# Patient Record
Sex: MALE | Race: WHITE | NOT HISPANIC OR LATINO | URBAN - METROPOLITAN AREA
[De-identification: names, ages, dates, MRNs, and addresses within clinical notes are randomized per-mention and may not be internally consistent; named-entity substitution may affect disease eponyms.]

---

## 2017-01-03 PROBLEM — Z00.00 ENCOUNTER FOR PREVENTIVE HEALTH EXAMINATION: Status: ACTIVE | Noted: 2017-01-03

## 2019-07-03 ENCOUNTER — INPATIENT (INPATIENT)
Facility: HOSPITAL | Age: 69
LOS: 5 days | Discharge: HOME | DRG: 180 | End: 2019-07-09
Attending: INTERNAL MEDICINE | Admitting: INTERNAL MEDICINE
Payer: MEDICARE

## 2019-07-03 VITALS
TEMPERATURE: 98 F | WEIGHT: 160.06 LBS | SYSTOLIC BLOOD PRESSURE: 117 MMHG | HEART RATE: 57 BPM | RESPIRATION RATE: 18 BRPM | OXYGEN SATURATION: 96 % | HEIGHT: 67 IN | DIASTOLIC BLOOD PRESSURE: 59 MMHG

## 2019-07-03 LAB
ALBUMIN SERPL ELPH-MCNC: 3 G/DL — LOW (ref 3.3–5)
ALP SERPL-CCNC: 301 U/L — HIGH (ref 40–120)
ALT FLD-CCNC: 22 U/L — SIGNIFICANT CHANGE UP (ref 10–45)
ANION GAP SERPL CALC-SCNC: 11 MMOL/L — SIGNIFICANT CHANGE UP (ref 5–17)
APTT BLD: 28.1 SEC — SIGNIFICANT CHANGE UP (ref 27.5–36.3)
AST SERPL-CCNC: 41 U/L — HIGH (ref 10–40)
BILIRUB SERPL-MCNC: 1.2 MG/DL — SIGNIFICANT CHANGE UP (ref 0.2–1.2)
BUN SERPL-MCNC: 36 MG/DL — HIGH (ref 7–23)
CALCIUM SERPL-MCNC: 9.2 MG/DL — SIGNIFICANT CHANGE UP (ref 8.4–10.5)
CHLORIDE SERPL-SCNC: 99 MMOL/L — SIGNIFICANT CHANGE UP (ref 96–108)
CO2 SERPL-SCNC: 23 MMOL/L — SIGNIFICANT CHANGE UP (ref 22–31)
CREAT SERPL-MCNC: 1.68 MG/DL — HIGH (ref 0.5–1.3)
GLUCOSE SERPL-MCNC: 315 MG/DL — HIGH (ref 70–99)
HCT VFR BLD CALC: 30.8 % — LOW (ref 39–50)
HGB BLD-MCNC: 10.5 G/DL — LOW (ref 13–17)
INR BLD: 1.31 RATIO — HIGH (ref 0.88–1.16)
MCHC RBC-ENTMCNC: 34.1 GM/DL — SIGNIFICANT CHANGE UP (ref 32–36)
MCHC RBC-ENTMCNC: 34.9 PG — HIGH (ref 27–34)
MCV RBC AUTO: 102 FL — HIGH (ref 80–100)
PLATELET # BLD AUTO: 73 K/UL — LOW (ref 150–400)
POTASSIUM SERPL-MCNC: 4.5 MMOL/L — SIGNIFICANT CHANGE UP (ref 3.5–5.3)
POTASSIUM SERPL-SCNC: 4.5 MMOL/L — SIGNIFICANT CHANGE UP (ref 3.5–5.3)
PROT SERPL-MCNC: 6.8 G/DL — SIGNIFICANT CHANGE UP (ref 6–8.3)
PROTHROM AB SERPL-ACNC: 15 SEC — HIGH (ref 10–12.9)
RBC # BLD: 3.01 M/UL — LOW (ref 4.2–5.8)
RBC # FLD: 12.7 % — SIGNIFICANT CHANGE UP (ref 10.3–14.5)
SODIUM SERPL-SCNC: 133 MMOL/L — LOW (ref 135–145)
TROPONIN T, HIGH SENSITIVITY RESULT: 28 NG/L — SIGNIFICANT CHANGE UP (ref 0–51)
TROPONIN T, HIGH SENSITIVITY RESULT: 29 NG/L — SIGNIFICANT CHANGE UP (ref 0–51)
WBC # BLD: 7.3 K/UL — SIGNIFICANT CHANGE UP (ref 3.8–10.5)
WBC # FLD AUTO: 7.3 K/UL — SIGNIFICANT CHANGE UP (ref 3.8–10.5)

## 2019-07-03 PROCEDURE — 71046 X-RAY EXAM CHEST 2 VIEWS: CPT | Mod: 26

## 2019-07-03 PROCEDURE — 99285 EMERGENCY DEPT VISIT HI MDM: CPT

## 2019-07-03 PROCEDURE — 71250 CT THORAX DX C-: CPT | Mod: 26

## 2019-07-03 PROCEDURE — 93010 ELECTROCARDIOGRAM REPORT: CPT

## 2019-07-03 RX ORDER — LIDOCAINE 4 G/100G
1 CREAM TOPICAL ONCE
Refills: 0 | Status: COMPLETED | OUTPATIENT
Start: 2019-07-03 | End: 2019-07-03

## 2019-07-03 RX ORDER — IPRATROPIUM/ALBUTEROL SULFATE 18-103MCG
3 AEROSOL WITH ADAPTER (GRAM) INHALATION ONCE
Refills: 0 | Status: DISCONTINUED | OUTPATIENT
Start: 2019-07-03 | End: 2019-07-03

## 2019-07-03 RX ORDER — LIDOCAINE 4 G/100G
1 CREAM TOPICAL ONCE
Refills: 0 | Status: DISCONTINUED | OUTPATIENT
Start: 2019-07-03 | End: 2019-07-03

## 2019-07-03 RX ORDER — MORPHINE SULFATE 50 MG/1
2 CAPSULE, EXTENDED RELEASE ORAL ONCE
Refills: 0 | Status: DISCONTINUED | OUTPATIENT
Start: 2019-07-03 | End: 2019-07-03

## 2019-07-03 RX ORDER — IPRATROPIUM/ALBUTEROL SULFATE 18-103MCG
3 AEROSOL WITH ADAPTER (GRAM) INHALATION ONCE
Refills: 0 | Status: COMPLETED | OUTPATIENT
Start: 2019-07-03 | End: 2019-07-03

## 2019-07-03 RX ADMIN — Medication 3 MILLILITER(S): at 16:44

## 2019-07-03 RX ADMIN — LIDOCAINE 1 PATCH: 4 CREAM TOPICAL at 17:56

## 2019-07-03 RX ADMIN — MORPHINE SULFATE 2 MILLIGRAM(S): 50 CAPSULE, EXTENDED RELEASE ORAL at 21:25

## 2019-07-03 RX ADMIN — MORPHINE SULFATE 2 MILLIGRAM(S): 50 CAPSULE, EXTENDED RELEASE ORAL at 17:56

## 2019-07-03 NOTE — ED ADULT NURSE NOTE - OBJECTIVE STATEMENT
68M comes to ED c/o left sided rib pain. Patient states he has been coughing "for a few weeks" and has a new diagnosis of liver CA. He has pain to left lateral side of ribs worse with inspiration. He is a&ox3 and does not appear to be in any acute distress. He denies sternal chest pain/SOB on exertion/N/V/D/dizziness/abdominal pain/fever/chills. States his cough is brown mucus. On exam, tenderness to left lateral ribs, clear lungs, no edema, abdomen soft nontender. Will continue to monitor.

## 2019-07-03 NOTE — ED ADULT NURSE NOTE - CHPI ED NUR SYMPTOMS NEG
no fever/no nausea/no syncope/no diaphoresis/no vomiting/no dizziness/no shortness of breath/no chills/no congestion

## 2019-07-03 NOTE — ED PROVIDER NOTE - ATTENDING CONTRIBUTION TO CARE
Attending MD Alaniz:   I personally have seen and examined this patient.  Physician assistant note reviewed and agree on plan of care and except where noted.  See below for details.     Seen in [place], accompanied by wife and mom  Oncologist Dr. Bruno Khan  PMD Dr. Andrew Malloy    68M with PMH/PSH including HTN, DM, hepatocellular carcinoma with mets not on chemo, cirrhosis with portal hypertension presents to the ED with three days of L sided rib pain for three days.  Reports pain worsened with cough, reports feels like stabbing/knife like pain.  Reports pain with inspiration.  Denies exertional worsening.  Reports productive yellowish brown cough.  Review of patient's results on personal phone via [employer] portal "Nuclear Bone Scan Whole Body 6/20/19" abnormal focal increased uptake in the L hip, R distal femur, anterolateral L 7th rib and posterior R 4th rib highly suspicious for bone metastatic disease.  CT chest without contrast 6/14/19 "multifocal bronchial ectasia with mucous plugging and patchy nodularity exhibits a waxing and waning pattern in comparison to 10/20/17 with overall slight progression.  A chronic infectious/inflammatory process such as nontuberculous mycobacterium is favored.  No suspicious developing thoracic LAD is seen.  Cirrhotic morphology of the liver with ill-defined hypodense regions that may be related to prior therapy or underlying lesions."  Denies abdominal pain, nausea, vomiting, diarrhea, blood in stools. Denies dysuria, hematuria, change in urinary habits including frequency, urgency. Reports takes lactulose every morning.  Uses walker for ambulation.      TO BE COMPLETED Attending MD Alaniz:   I personally have seen and examined this patient.  Physician assistant note reviewed and agree on plan of care and except where noted.  See below for details.     Seen in [place], accompanied by wife and mom  Oncologist Dr. Bruno Khan  PMD Dr. Andrew Malloy    68M with PMH/PSH including HTN, DM, hepatocellular carcinoma with mets not on chemo, cirrhosis with portal hypertension presents to the ED with three days of L sided rib pain for three days.  Reports pain worsened with cough, reports feels like stabbing/knife like pain.  Reports pain with inspiration.  Denies exertional worsening.  Reports productive yellowish brown cough.  Denies shortness of breath.  Review of patient's results on personal phone via [facility] portal "Nuclear Bone Scan Whole Body 6/20/19" abnormal focal increased uptake in the L hip, R distal femur, anterolateral L 7th rib and posterior R 4th rib highly suspicious for bone metastatic disease.  CT chest without contrast 6/14/19 "multifocal bronchial ectasia with mucous plugging and patchy nodularity exhibits a waxing and waning pattern in comparison to 10/20/17 with overall slight progression.  A chronic infectious/inflammatory process such as nontuberculous mycobacterium is favored.  No suspicious developing thoracic LAD is seen.  Cirrhotic morphology of the liver with ill-defined hypodense regions that may be related to prior therapy or underlying lesions."  Denies abdominal pain, nausea, vomiting, diarrhea, blood in stools. Denies dysuria, hematuria, change in urinary habits including frequency, urgency. Reports takes lactulose every morning.  Uses walker for ambulation.  Denies fevers, chills.  On exam, NAD, head NCAT, PERRL, FROM at neck, no tenderness to midline palpation, no stepoffs along length of spine, lungs CTAB with good inspiratory effort, no wheezing, no rhonchi, no rales, +tenderness to palpation at L sided mid to lower ribs, from anterior axillary line to posterior axillary line, +S1S2, no m/r/g, abdomen soft with +BS, NT, ND, no CVAT, moving all extremities with 5/5 strength bilateral upper and lower extremities, good and equal  strength bilaterally, no calf tenderness, swelling, erythema or warmth; A/P: 68M    TO BE COMPLETED Attending MD Alaniz:   I personally have seen and examined this patient.  Physician assistant note reviewed and agree on plan of care and except where noted.  See below for details.     Seen in [place], accompanied by wife and mom  Oncologist Dr. Bruno Khan  PMD Dr. Andrew Malloy    68M with PMH/PSH including HTN, DM, hepatocellular carcinoma with mets not on chemo, cirrhosis with portal hypertension presents to the ED with three days of L sided rib pain for three days.  Reports pain worsened with cough, reports feels like stabbing/knife like pain.  Reports pain with inspiration.  Denies exertional worsening.  Reports productive yellowish brown cough.  Denies shortness of breath.  Review of patient's results on personal phone via [facility] portal "Nuclear Bone Scan Whole Body 6/20/19" abnormal focal increased uptake in the L hip, R distal femur, anterolateral L 7th rib and posterior R 4th rib highly suspicious for bone metastatic disease.  CT chest without contrast 6/14/19 "multifocal bronchial ectasia with mucous plugging and patchy nodularity exhibits a waxing and waning pattern in comparison to 10/20/17 with overall slight progression.  A chronic infectious/inflammatory process such as nontuberculous mycobacterium is favored.  No suspicious developing thoracic LAD is seen.  Cirrhotic morphology of the liver with ill-defined hypodense regions that may be related to prior therapy or underlying lesions."  Denies abdominal pain, nausea, vomiting, diarrhea, blood in stools. Denies dysuria, hematuria, change in urinary habits including frequency, urgency. Reports takes lactulose every morning.  Uses walker for ambulation.  Denies fevers, chills.  On exam, NAD, head NCAT, PERRL, FROM at neck, no tenderness to midline palpation, no stepoffs along length of spine, lungs CTAB with good inspiratory effort, no wheezing, no rhonchi, no rales, +tenderness to palpation at L sided mid to lower ribs, from anterior axillary line to posterior axillary line, +S1S2, no m/r/g, abdomen soft with +BS, NT, ND, no CVAT, moving all extremities with 5/5 strength bilateral upper and lower extremities, good and equal  strength bilaterally, no calf tenderness, swelling, erythema or warmth; A/P: 68M with L sided rib pain, CXR, labs, EKG, CT chest r/o pathologic fracture, DDx includes PE, VQ vs CTA, pain control, reassess    ADDENDUM: After interview, patient's wife reveals that patient was recently off AC for biopsy

## 2019-07-03 NOTE — ED ADULT NURSE NOTE - PMH
Blood clot of common bile duct    Diabetes    Gout    Hypertension Blood clot of common bile duct    Diabetes    Gout    Hypertension    Lung cancer, primary, with metastasis from lung to other site

## 2019-07-03 NOTE — ED ADULT NURSE NOTE - NSIMPLEMENTINTERV_GEN_ALL_ED
Implemented All Fall with Harm Risk Interventions:  [place] to call system. Call bell, personal items and telephone within reach. Instruct patient to call for assistance. Room bathroom lighting operational. Non-slip footwear when patient is off stretcher. Physically safe environment: no spills, clutter or unnecessary equipment. Stretcher in lowest position, wheels locked, appropriate side rails in place. Provide visual cue, wrist band, yellow gown, etc. Monitor gait and stability. Monitor for mental status changes and reorient to person, place, and time. Review medications for side effects contributing to fall risk. Reinforce activity limits and safety measures with patient and family. Provide visual clues: red socks.

## 2019-07-03 NOTE — ED PROVIDER NOTE - PROGRESS NOTE DETAILS
Pt signed out pending non con chest ct, pt unable to get contrasted ct at this time 2nd to renal function. Rodriguez PGY3: Spoke to Dr Khan, pt had an outpt biopsy of an acetabular lytic lesion, was recently started on percocets for pain control, pain reproducible, will trial oral medication to control pain, if rpt trop negative, possible dc? per Dr Khan ideally would have pt get pain controlled outpt Rodriguez PGY3: after discussion w/ attdg, pt had recently stopped AC due to acetabular biopsy - cannot rule out PE, likely msk but will admit for VQ scan, Dr Khan requesting admission to Dr Carrion Michael PGY3: accepted to Dr Carrion's service Rodriguez PGY3: accepted to Dr Carrion's service    Attending MD Alaniz: Discussed CT findings with patient, wife and mother.  Amenable to admission to rule out PE.

## 2019-07-03 NOTE — ED PROVIDER NOTE - OBJECTIVE STATEMENT
pt is a pt is a 69 y/o male with a PMH of HTN, DM, on insulin, Liver ca with mets, not on cemo, presents to ed with 1-2 days of pleuritic left chest pain. Pain is 5/10 non radiation. Pt denies any fever, headache, abd pain, n/v/d.

## 2019-07-04 DIAGNOSIS — Z29.9 ENCOUNTER FOR PROPHYLACTIC MEASURES, UNSPECIFIED: ICD-10-CM

## 2019-07-04 DIAGNOSIS — I82.90 ACUTE EMBOLISM AND THROMBOSIS OF UNSPECIFIED VEIN: ICD-10-CM

## 2019-07-04 DIAGNOSIS — Z71.89 OTHER SPECIFIED COUNSELING: ICD-10-CM

## 2019-07-04 DIAGNOSIS — C22.8 MALIGNANT NEOPLASM OF LIVER, PRIMARY, UNSPECIFIED AS TO TYPE: ICD-10-CM

## 2019-07-04 DIAGNOSIS — N18.3 CHRONIC KIDNEY DISEASE, STAGE 3 (MODERATE): ICD-10-CM

## 2019-07-04 DIAGNOSIS — R07.9 CHEST PAIN, UNSPECIFIED: ICD-10-CM

## 2019-07-04 DIAGNOSIS — I10 ESSENTIAL (PRIMARY) HYPERTENSION: ICD-10-CM

## 2019-07-04 DIAGNOSIS — R09.1 PLEURISY: ICD-10-CM

## 2019-07-04 DIAGNOSIS — K74.60 UNSPECIFIED CIRRHOSIS OF LIVER: ICD-10-CM

## 2019-07-04 DIAGNOSIS — E11.9 TYPE 2 DIABETES MELLITUS WITHOUT COMPLICATIONS: ICD-10-CM

## 2019-07-04 LAB
ALBUMIN SERPL ELPH-MCNC: 3.1 G/DL — LOW (ref 3.3–5)
ALP SERPL-CCNC: 295 U/L — HIGH (ref 40–120)
ALT FLD-CCNC: 21 U/L — SIGNIFICANT CHANGE UP (ref 10–45)
ANION GAP SERPL CALC-SCNC: 13 MMOL/L — SIGNIFICANT CHANGE UP (ref 5–17)
APTT BLD: 103.8 SEC — HIGH (ref 27.5–36.3)
APTT BLD: 72.4 SEC — HIGH (ref 27.5–36.3)
AST SERPL-CCNC: 34 U/L — SIGNIFICANT CHANGE UP (ref 10–40)
BILIRUB DIRECT SERPL-MCNC: 0.4 MG/DL — HIGH (ref 0–0.2)
BILIRUB INDIRECT FLD-MCNC: 0.6 MG/DL — SIGNIFICANT CHANGE UP (ref 0.2–1)
BILIRUB SERPL-MCNC: 1 MG/DL — SIGNIFICANT CHANGE UP (ref 0.2–1.2)
BLD GP AB SCN SERPL QL: NEGATIVE — SIGNIFICANT CHANGE UP
BUN SERPL-MCNC: 37 MG/DL — HIGH (ref 7–23)
CALCIUM SERPL-MCNC: 9.4 MG/DL — SIGNIFICANT CHANGE UP (ref 8.4–10.5)
CHLORIDE SERPL-SCNC: 96 MMOL/L — SIGNIFICANT CHANGE UP (ref 96–108)
CO2 SERPL-SCNC: 26 MMOL/L — SIGNIFICANT CHANGE UP (ref 22–31)
CREAT SERPL-MCNC: 1.72 MG/DL — HIGH (ref 0.5–1.3)
GLUCOSE BLDC GLUCOMTR-MCNC: 116 MG/DL — HIGH (ref 70–99)
GLUCOSE BLDC GLUCOMTR-MCNC: 131 MG/DL — HIGH (ref 70–99)
GLUCOSE BLDC GLUCOMTR-MCNC: 213 MG/DL — HIGH (ref 70–99)
GLUCOSE BLDC GLUCOMTR-MCNC: 216 MG/DL — HIGH (ref 70–99)
GLUCOSE BLDC GLUCOMTR-MCNC: 83 MG/DL — SIGNIFICANT CHANGE UP (ref 70–99)
GLUCOSE SERPL-MCNC: 198 MG/DL — HIGH (ref 70–99)
HBA1C BLD-MCNC: 6.1 % — HIGH (ref 4–5.6)
HCT VFR BLD CALC: 31.9 % — LOW (ref 39–50)
HCV AB S/CO SERPL IA: 10.11 S/CO — HIGH (ref 0–0.99)
HCV AB SERPL-IMP: REACTIVE
HEPARIN-PF4 AB RESULT: 0.8 U/ML — SIGNIFICANT CHANGE UP (ref 0–0.9)
HGB BLD-MCNC: 10.9 G/DL — LOW (ref 13–17)
MCHC RBC-ENTMCNC: 34 GM/DL — SIGNIFICANT CHANGE UP (ref 32–36)
MCHC RBC-ENTMCNC: 34.7 PG — HIGH (ref 27–34)
MCV RBC AUTO: 102 FL — HIGH (ref 80–100)
PF4 HEPARIN CMPLX AB SER-ACNC: NEGATIVE — SIGNIFICANT CHANGE UP
PLATELET # BLD AUTO: 84 K/UL — LOW (ref 150–400)
POTASSIUM SERPL-MCNC: 4.3 MMOL/L — SIGNIFICANT CHANGE UP (ref 3.5–5.3)
POTASSIUM SERPL-SCNC: 4.3 MMOL/L — SIGNIFICANT CHANGE UP (ref 3.5–5.3)
PROT SERPL-MCNC: 7 G/DL — SIGNIFICANT CHANGE UP (ref 6–8.3)
RBC # BLD: 3.13 M/UL — LOW (ref 4.2–5.8)
RBC # FLD: 12.9 % — SIGNIFICANT CHANGE UP (ref 10.3–14.5)
RH IG SCN BLD-IMP: POSITIVE — SIGNIFICANT CHANGE UP
SODIUM SERPL-SCNC: 135 MMOL/L — SIGNIFICANT CHANGE UP (ref 135–145)
WBC # BLD: 7.5 K/UL — SIGNIFICANT CHANGE UP (ref 3.8–10.5)
WBC # FLD AUTO: 7.5 K/UL — SIGNIFICANT CHANGE UP (ref 3.8–10.5)

## 2019-07-04 PROCEDURE — 99223 1ST HOSP IP/OBS HIGH 75: CPT

## 2019-07-04 PROCEDURE — 70450 CT HEAD/BRAIN W/O DYE: CPT | Mod: 26

## 2019-07-04 PROCEDURE — 99497 ADVNCD CARE PLAN 30 MIN: CPT | Mod: 25

## 2019-07-04 PROCEDURE — 78582 LUNG VENTILAT&PERFUS IMAGING: CPT | Mod: 26

## 2019-07-04 PROCEDURE — 93970 EXTREMITY STUDY: CPT | Mod: 26

## 2019-07-04 RX ORDER — SPIRONOLACTONE 25 MG/1
50 TABLET, FILM COATED ORAL EVERY 12 HOURS
Refills: 0 | Status: DISCONTINUED | OUTPATIENT
Start: 2019-07-04 | End: 2019-07-05

## 2019-07-04 RX ORDER — TRAZODONE HCL 50 MG
50 TABLET ORAL AT BEDTIME
Refills: 0 | Status: DISCONTINUED | OUTPATIENT
Start: 2019-07-04 | End: 2019-07-09

## 2019-07-04 RX ORDER — TRAZODONE HCL 50 MG
1 TABLET ORAL
Qty: 0 | Refills: 0 | DISCHARGE

## 2019-07-04 RX ORDER — INSULIN GLARGINE 100 [IU]/ML
30 INJECTION, SOLUTION SUBCUTANEOUS AT BEDTIME
Refills: 0 | Status: DISCONTINUED | OUTPATIENT
Start: 2019-07-04 | End: 2019-07-08

## 2019-07-04 RX ORDER — PROPRANOLOL HCL 160 MG
0 CAPSULE, EXTENDED RELEASE 24HR ORAL
Qty: 0 | Refills: 0 | DISCHARGE

## 2019-07-04 RX ORDER — APIXABAN 2.5 MG/1
0 TABLET, FILM COATED ORAL
Qty: 0 | Refills: 0 | DISCHARGE

## 2019-07-04 RX ORDER — ALBUTEROL 90 UG/1
2 AEROSOL, METERED ORAL EVERY 6 HOURS
Refills: 0 | Status: DISCONTINUED | OUTPATIENT
Start: 2019-07-04 | End: 2019-07-09

## 2019-07-04 RX ORDER — DEXTROSE 50 % IN WATER 50 %
12.5 SYRINGE (ML) INTRAVENOUS ONCE
Refills: 0 | Status: DISCONTINUED | OUTPATIENT
Start: 2019-07-04 | End: 2019-07-09

## 2019-07-04 RX ORDER — HEPARIN SODIUM 5000 [USP'U]/ML
6000 INJECTION INTRAVENOUS; SUBCUTANEOUS EVERY 6 HOURS
Refills: 0 | Status: DISCONTINUED | OUTPATIENT
Start: 2019-07-04 | End: 2019-07-05

## 2019-07-04 RX ORDER — PANTOPRAZOLE SODIUM 20 MG/1
40 TABLET, DELAYED RELEASE ORAL
Refills: 0 | Status: DISCONTINUED | OUTPATIENT
Start: 2019-07-04 | End: 2019-07-09

## 2019-07-04 RX ORDER — PROPRANOLOL HCL 160 MG
1 CAPSULE, EXTENDED RELEASE 24HR ORAL
Qty: 0 | Refills: 0 | DISCHARGE

## 2019-07-04 RX ORDER — PREGABALIN 225 MG/1
1 CAPSULE ORAL
Qty: 0 | Refills: 0 | DISCHARGE

## 2019-07-04 RX ORDER — INSULIN LISPRO 100/ML
VIAL (ML) SUBCUTANEOUS
Refills: 0 | Status: DISCONTINUED | OUTPATIENT
Start: 2019-07-04 | End: 2019-07-09

## 2019-07-04 RX ORDER — PROPRANOLOL HCL 160 MG
40 CAPSULE, EXTENDED RELEASE 24HR ORAL
Refills: 0 | Status: DISCONTINUED | OUTPATIENT
Start: 2019-07-04 | End: 2019-07-09

## 2019-07-04 RX ORDER — HEPARIN SODIUM 5000 [USP'U]/ML
3000 INJECTION INTRAVENOUS; SUBCUTANEOUS EVERY 6 HOURS
Refills: 0 | Status: DISCONTINUED | OUTPATIENT
Start: 2019-07-04 | End: 2019-07-05

## 2019-07-04 RX ORDER — OMEPRAZOLE 10 MG/1
1 CAPSULE, DELAYED RELEASE ORAL
Qty: 0 | Refills: 0 | DISCHARGE

## 2019-07-04 RX ORDER — SPIRONOLACTONE 25 MG/1
0 TABLET, FILM COATED ORAL
Qty: 0 | Refills: 0 | DISCHARGE

## 2019-07-04 RX ORDER — HYDRALAZINE HCL 50 MG
25 TABLET ORAL
Refills: 0 | Status: DISCONTINUED | OUTPATIENT
Start: 2019-07-04 | End: 2019-07-09

## 2019-07-04 RX ORDER — TRAZODONE HCL 50 MG
0 TABLET ORAL
Qty: 0 | Refills: 0 | DISCHARGE

## 2019-07-04 RX ORDER — HYDRALAZINE HCL 50 MG
1 TABLET ORAL
Qty: 0 | Refills: 0 | DISCHARGE

## 2019-07-04 RX ORDER — ALBUTEROL 90 UG/1
2 AEROSOL, METERED ORAL
Qty: 0 | Refills: 0 | DISCHARGE

## 2019-07-04 RX ORDER — LACTULOSE 10 G/15ML
0 SOLUTION ORAL
Qty: 0 | Refills: 0 | DISCHARGE

## 2019-07-04 RX ORDER — CHOLECALCIFEROL (VITAMIN D3) 125 MCG
2000 CAPSULE ORAL DAILY
Refills: 0 | Status: DISCONTINUED | OUTPATIENT
Start: 2019-07-04 | End: 2019-07-09

## 2019-07-04 RX ORDER — FUROSEMIDE 40 MG
0 TABLET ORAL
Qty: 0 | Refills: 0 | DISCHARGE

## 2019-07-04 RX ORDER — DEXTROSE 50 % IN WATER 50 %
25 SYRINGE (ML) INTRAVENOUS ONCE
Refills: 0 | Status: DISCONTINUED | OUTPATIENT
Start: 2019-07-04 | End: 2019-07-09

## 2019-07-04 RX ORDER — ENOXAPARIN SODIUM 100 MG/ML
32 INJECTION SUBCUTANEOUS
Qty: 0 | Refills: 0 | DISCHARGE

## 2019-07-04 RX ORDER — DEXTROSE 50 % IN WATER 50 %
15 SYRINGE (ML) INTRAVENOUS ONCE
Refills: 0 | Status: DISCONTINUED | OUTPATIENT
Start: 2019-07-04 | End: 2019-07-09

## 2019-07-04 RX ORDER — LACTULOSE 10 G/15ML
15 SOLUTION ORAL
Qty: 0 | Refills: 0 | DISCHARGE

## 2019-07-04 RX ORDER — CHOLECALCIFEROL (VITAMIN D3) 125 MCG
1 CAPSULE ORAL
Qty: 0 | Refills: 0 | DISCHARGE

## 2019-07-04 RX ORDER — PREGABALIN 225 MG/1
1000 CAPSULE ORAL DAILY
Refills: 0 | Status: DISCONTINUED | OUTPATIENT
Start: 2019-07-04 | End: 2019-07-09

## 2019-07-04 RX ORDER — OXYCODONE HYDROCHLORIDE 5 MG/1
5 TABLET ORAL EVERY 4 HOURS
Refills: 0 | Status: DISCONTINUED | OUTPATIENT
Start: 2019-07-04 | End: 2019-07-09

## 2019-07-04 RX ORDER — FUROSEMIDE 40 MG
40 TABLET ORAL
Refills: 0 | Status: DISCONTINUED | OUTPATIENT
Start: 2019-07-04 | End: 2019-07-05

## 2019-07-04 RX ORDER — INSULIN LISPRO 100/ML
VIAL (ML) SUBCUTANEOUS AT BEDTIME
Refills: 0 | Status: DISCONTINUED | OUTPATIENT
Start: 2019-07-04 | End: 2019-07-09

## 2019-07-04 RX ORDER — OMEPRAZOLE 10 MG/1
0 CAPSULE, DELAYED RELEASE ORAL
Qty: 0 | Refills: 0 | DISCHARGE

## 2019-07-04 RX ORDER — LACTULOSE 10 G/15ML
10 SOLUTION ORAL DAILY
Refills: 0 | Status: DISCONTINUED | OUTPATIENT
Start: 2019-07-04 | End: 2019-07-09

## 2019-07-04 RX ORDER — HEPARIN SODIUM 5000 [USP'U]/ML
INJECTION INTRAVENOUS; SUBCUTANEOUS
Qty: 25000 | Refills: 0 | Status: DISCONTINUED | OUTPATIENT
Start: 2019-07-04 | End: 2019-07-05

## 2019-07-04 RX ORDER — HYDRALAZINE HCL 50 MG
0 TABLET ORAL
Qty: 0 | Refills: 0 | DISCHARGE

## 2019-07-04 RX ORDER — GLUCAGON INJECTION, SOLUTION 0.5 MG/.1ML
1 INJECTION, SOLUTION SUBCUTANEOUS ONCE
Refills: 0 | Status: DISCONTINUED | OUTPATIENT
Start: 2019-07-04 | End: 2019-07-09

## 2019-07-04 RX ORDER — SODIUM CHLORIDE 9 MG/ML
1000 INJECTION, SOLUTION INTRAVENOUS
Refills: 0 | Status: DISCONTINUED | OUTPATIENT
Start: 2019-07-04 | End: 2019-07-09

## 2019-07-04 RX ORDER — OXYCODONE HYDROCHLORIDE 5 MG/1
5 TABLET ORAL ONCE
Refills: 0 | Status: DISCONTINUED | OUTPATIENT
Start: 2019-07-04 | End: 2019-07-04

## 2019-07-04 RX ADMIN — Medication 2000 UNIT(S): at 11:34

## 2019-07-04 RX ADMIN — Medication 40 MILLIGRAM(S): at 17:20

## 2019-07-04 RX ADMIN — INSULIN GLARGINE 30 UNIT(S): 100 INJECTION, SOLUTION SUBCUTANEOUS at 22:06

## 2019-07-04 RX ADMIN — Medication 50 MILLIGRAM(S): at 23:12

## 2019-07-04 RX ADMIN — MORPHINE SULFATE 2 MILLIGRAM(S): 50 CAPSULE, EXTENDED RELEASE ORAL at 01:14

## 2019-07-04 RX ADMIN — LACTULOSE 10 GRAM(S): 10 SOLUTION ORAL at 12:15

## 2019-07-04 RX ADMIN — OXYCODONE HYDROCHLORIDE 5 MILLIGRAM(S): 5 TABLET ORAL at 01:13

## 2019-07-04 RX ADMIN — Medication 40 MILLIGRAM(S): at 05:27

## 2019-07-04 RX ADMIN — OXYCODONE HYDROCHLORIDE 5 MILLIGRAM(S): 5 TABLET ORAL at 13:08

## 2019-07-04 RX ADMIN — OXYCODONE HYDROCHLORIDE 5 MILLIGRAM(S): 5 TABLET ORAL at 07:58

## 2019-07-04 RX ADMIN — SPIRONOLACTONE 50 MILLIGRAM(S): 25 TABLET, FILM COATED ORAL at 17:20

## 2019-07-04 RX ADMIN — OXYCODONE HYDROCHLORIDE 5 MILLIGRAM(S): 5 TABLET ORAL at 13:33

## 2019-07-04 RX ADMIN — LIDOCAINE 1 PATCH: 4 CREAM TOPICAL at 05:18

## 2019-07-04 RX ADMIN — PREGABALIN 1000 MICROGRAM(S): 225 CAPSULE ORAL at 11:34

## 2019-07-04 RX ADMIN — OXYCODONE HYDROCHLORIDE 5 MILLIGRAM(S): 5 TABLET ORAL at 21:54

## 2019-07-04 RX ADMIN — SPIRONOLACTONE 50 MILLIGRAM(S): 25 TABLET, FILM COATED ORAL at 05:27

## 2019-07-04 RX ADMIN — Medication 25 MILLIGRAM(S): at 17:20

## 2019-07-04 RX ADMIN — PANTOPRAZOLE SODIUM 40 MILLIGRAM(S): 20 TABLET, DELAYED RELEASE ORAL at 05:27

## 2019-07-04 RX ADMIN — Medication 2: at 12:08

## 2019-07-04 RX ADMIN — OXYCODONE HYDROCHLORIDE 5 MILLIGRAM(S): 5 TABLET ORAL at 20:56

## 2019-07-04 RX ADMIN — HEPARIN SODIUM 1300 UNIT(S)/HR: 5000 INJECTION INTRAVENOUS; SUBCUTANEOUS at 11:34

## 2019-07-04 RX ADMIN — Medication 25 MILLIGRAM(S): at 05:27

## 2019-07-04 RX ADMIN — HEPARIN SODIUM 1100 UNIT(S)/HR: 5000 INJECTION INTRAVENOUS; SUBCUTANEOUS at 18:23

## 2019-07-04 RX ADMIN — LIDOCAINE 1 PATCH: 4 CREAM TOPICAL at 01:14

## 2019-07-04 RX ADMIN — OXYCODONE HYDROCHLORIDE 5 MILLIGRAM(S): 5 TABLET ORAL at 08:28

## 2019-07-04 RX ADMIN — HEPARIN SODIUM 1300 UNIT(S)/HR: 5000 INJECTION INTRAVENOUS; SUBCUTANEOUS at 04:35

## 2019-07-04 NOTE — PROGRESS NOTE ADULT - ASSESSMENT
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  Plan: suspect from metastasis   - CT chest w/o contrast documents new lung nodules unknown to patient or family. mets to ribs previously identified.  will do v/q  pulm eval   onc eval  / dr marquez   TTE and LE doppler  a/c for now started  check hit w/ dec plts heme to f/u   -[id number]. takes oxycodone 5mg PO as needed but is new Rx and no more than 1 tab daily. for current pain will place on oxycodone 5mg PO q 4hr prn for moderate to severe pain with holding parameters      : Liver cancer, primary, with metastasis from liver to other site.     Stage IV liver ca  onc eval   - met to bone, lung and abdomen.      ·  Problem: Thrombosis.  Plan: Hx of abdominal vessel thrombosis however patient unsure of type  a/c for now      ·  Problem: Hepatic cirrhosis, unspecified hepatic cirrhosis type, unspecified whether ascites present.  : Hx of cirrhosis >15 year, hx of EtOH and HepC (treated)  - c/w home lasix, spironolactone, propranolol and lactulose.       ·  Problem: Diabetes mellitus.  Plan: DM2 on inslulin  c.w  lantus to 30U and iss  - carb consistent diet.       Problem: Essential hypertension.   Plan: - c/w home antihypertensives with hold parameters.      ·  Problem: Stage 3 chronic kidney disease.  renal eval   - avoid nephrotoxic agents.

## 2019-07-04 NOTE — H&P ADULT - HISTORY OF PRESENT ILLNESS
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 Patient reports having dry cough without hemoptysis over the course of 2-3 weeks with associated sob. Then over last 2d began to develop progressive sharp pain on inspiration. He called his physician who instructed him to go to the ED. He notably was recently taken off of apixiban to bone biopsy of pelvic lesion on 6/28/19 and he did not restart as he was unsure if he was supposed to. No fever, chills, chest pain at rest (only with inspiration), no inability to lay flat, no swelling in lower extremities. His daughter informs that his oncologist informed his wife that he has stage IV disease with need for palliative RT and expected life expectancy of 7mo however patient is unaware of prognostication beyond spread to bones.

## 2019-07-04 NOTE — ED ADULT NURSE REASSESSMENT NOTE - NS ED NURSE REASSESS COMMENT FT1
MD states pt ok to get home dose of insulin from wife. Pt given 14 units of Novolog (based off home sliding scale) and 32 units of Lantus. will repeat FS.

## 2019-07-04 NOTE — CONSULT NOTE ADULT - ASSESSMENT
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       1- CKD III suspcted  2- hx edema   3- pain upon inspiration/sob suspected P.E. vs. bony lesion pains  4- HTN       cr suspected to be baseline  cont lasix 40 mg bid and aldactone   check uric acid  avoid iv contrast if possible. if iv contrast required then would suggest hold diuretics and give IVF   cont hydralazine 25 mg tid   anticoagulation   V/Q scan

## 2019-07-04 NOTE — CONSULT NOTE ADULT - ASSESSMENT
67 y/o man with   1) HCC- outpatient follow up with Dr. Khan.  -s/p bone bx  2) pleuritic cp. Possible related to bone lesions.  r/o PE.  Patient started on AC.  VQ scan ordered.  3) pain mgmt as per med  4) Macrocytic anemia- likely related to liver disease.  h/h is adequate , can monitor for now.

## 2019-07-04 NOTE — H&P ADULT - PROBLEM SELECTOR PLAN 1
suspect from metastasis however cannot r/o PE  - CT chest w/o contrast documents new lung nodules unknown to patient or family. mets to ribs previously identified.  - Will need day hospitalist to f/u without patient Onc to determine interval change from recent CT chest scans.  - Patient has high likelihood of PE as well given hx of malignancy w/ previous thrombosis and off ac. Currently hemodyncamically stable on room air with bradycardia on ekg. Due to elevated Cr cannot get CTPA and VQ scan will have poor study given new lung nodules. Will get TTE and LE DVT study to further risk stratify. Start heparin infusion as unclear if will need further invasive testing for staging. If not then patient will benefirt from LMWH therapy. suspect from metastasis however cannot r/o PE  - CT chest w/o contrast documents new lung nodules unknown to patient or family. mets to ribs previously identified.  - Will need day hospitalist to f/u without patient Onc to determine interval change from recent CT chest scans.  - Patient has high likelihood of PE as well given hx of malignancy w/ previous thrombosis and off ac. Currently hemodyncamically stable on room air with bradycardia on ekg. Due to elevated Cr cannot get CTPA and VQ scan will have poor study given new lung nodules. Will get TTE and LE DVT study to further risk stratify. Start heparin infusion as unclear if will need further invasive testing for staging. If not then patient will benefirt from LMWH therapy.  -iSTOP#[id number]. takes oxycodone 5mg PO as needed but is new Rx and no more than 1 tab daily. for current pain will place on oxycodone 5mg PO q 4hr prn for moderate to severe pain with holding parameters

## 2019-07-04 NOTE — H&P ADULT - PROBLEM SELECTOR PLAN 9
I spent 30minutes discussing goals of care and plan regarding progressive cancer with patient, wife and daughter at bedside. The patient wants his wife (surrogate) to make decisions if he cannot. For now he would want full code. Discussed at length the need to have oncologist evaluate the role of palliative chemotherapy or RT with goal to prolong symptom free years. Patient does not know full prognostication of limited time per his daughter and wife and therefore following oncology evaluation they would be amenable for palliative medicine to assist in further management.

## 2019-07-04 NOTE — H&P ADULT - PROBLEM SELECTOR PLAN 3
Hx of abdominal vessel thrombosis however patient unsure of type. Was instructed to hold prior to bone biopsy and never restarted. start heparin infusion and will need day hospitalist to follow up with outpatient oncologist.  - will obtain type and screen, patient consented to transfusion if required

## 2019-07-04 NOTE — CONSULT NOTE ADULT - SUBJECTIVE AND OBJECTIVE BOX
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 Patient reports having dry cough without hemoptysis over the course of 2-3 weeks with associated sob. Then over last 2d began to develop progressive sharp pain on inspiration. He called his physician who instructed him to go to the ED. He notably was recently taken off of apixiban to bone biopsy of pelvic lesion on 6/28/19 and he did not restart as he was unsure if he was supposed to. No fever, chills, chest pain at rest (only with inspiration), no inability to lay flat, no swelling in lower extremities. as documented oncologist informed his wife that he has stage IV disease with need for palliative RT. pt admitted with sob and suspected P.E.   he takes lasix and aldactone at home     PAST HISTORY  --------------------------------------------------------------------------------  PAST MEDICAL & SURGICAL HISTORY:  Lung cancer, primary, with metastasis from lung to other site  Blood clot of common bile duct  Gout  Hypertension  Diabetes  No significant past surgical history    FAMILY HISTORY:  FH: pancreatic cancer    PAST SOCIAL HISTORY: remote tobacco use     ALLERGIES & MEDICATIONS  --------------------------------------------------------------------------------  Allergies    eggs (Nausea)  No Known Drug Allergies    Intolerances      Standing Inpatient Medications  cholecalciferol 2000 Unit(s) Oral daily  cyanocobalamin 1000 MICROGram(s) Oral daily  dextrose 5%. 1000 milliLiter(s) IV Continuous <Continuous>  dextrose 50% Injectable 12.5 Gram(s) IV Push once  dextrose 50% Injectable 25 Gram(s) IV Push once  dextrose 50% Injectable 25 Gram(s) IV Push once  furosemide    Tablet 40 milliGRAM(s) Oral two times a day  heparin  Infusion.  Unit(s)/Hr IV Continuous <Continuous>  hydrALAZINE 25 milliGRAM(s) Oral two times a day  insulin glargine Injectable (LANTUS) 30 Unit(s) SubCutaneous at bedtime  insulin lispro (HumaLOG) corrective regimen sliding scale   SubCutaneous three times a day before meals  insulin lispro (HumaLOG) corrective regimen sliding scale   SubCutaneous at bedtime  lactulose Syrup 10 Gram(s) Oral daily  pantoprazole    Tablet 40 milliGRAM(s) Oral before breakfast  propranolol 40 milliGRAM(s) Oral two times a day  spironolactone 50 milliGRAM(s) Oral every 12 hours  traZODone 50 milliGRAM(s) Oral at bedtime    PRN Inpatient Medications  ALBUTerol    90 MICROgram(s) HFA Inhaler 2 Puff(s) Inhalation every 6 hours PRN  dextrose 40% Gel 15 Gram(s) Oral once PRN  glucagon  Injectable 1 milliGRAM(s) IntraMuscular once PRN  heparin  Injectable 6000 Unit(s) IV Push every 6 hours PRN  heparin  Injectable 3000 Unit(s) IV Push every 6 hours PRN  oxyCODONE    IR 5 milliGRAM(s) Oral every 4 hours PRN      REVIEW OF SYSTEMS  --------------------------------------------------------------------------------  Gen: No  fevers/chills   Skin: No rashes  Head/Eyes/Ears/Mouth: No headache; Normal hearing;  No sinus pain/discomfort, sore throat  Respiratory: +  dyspnea,  no cough,  no wheezing, hemoptysis -   + pain upon deep inspiration   CV: No chest pain, or palp   GI: No abdominal pain, diarrhea, nausea, vomiting, melena  : No dysuria, decrease urination or hesitancy urinating  hematuria, nocturia  MSK: +  joint pain/swelling; no back pain  Neuro: No dizziness/lightheadedness,  also with no edema     All other systems were reviewed and are negative, except as noted.    VITALS/PHYSICAL EXAM  --------------------------------------------------------------------------------  T(C): 36.7 (07-04-19 @ 11:07), Max: 36.9 (07-03-19 @ 19:40)  HR: 76 (07-04-19 @ 17:07) (51 - 76)  BP: 114/61 (07-04-19 @ 17:07) (110/61 - 132/70)  RR: 18 (07-04-19 @ 11:07) (14 - 19)  SpO2: 96% (07-04-19 @ 11:07) (96% - 100%)  Wt(kg): --  Height (cm): 170.18 (07-03-19 @ 15:08)  Weight (kg): 72.6 (07-03-19 @ 15:08)  BMI (kg/m2): 25.1 (07-03-19 @ 15:08)  BSA (m2): 1.84 (07-03-19 @ 15:08)      07-04-19 @ 07:01  -  07-04-19 @ 18:28  --------------------------------------------------------  IN: 360 mL / OUT: 600 mL / NET: -240 mL      Physical Exam:  	Gen: Non toxic comfortable appearing   	no jvd , supple neck,   	Pulm: decrease bs  no rales or ronchi or wheezing  	CV: RRR, S1S2; no rub  	Back: No CVA tenderness; no sacral edema  	Abd: +BS, soft, nontender/nondistended  	: No suprapubic tenderness  	UE: Warm, no cyanosis  no clubbing,  no edema  	LE: Warm, no cyanosis  no clubbing, no edema  	Neuro: alert and oriented. speech coherent   	Skin: Warm, no decrease skin turgor       LABS/STUDIES  --------------------------------------------------------------------------------              10.9   7.5   >-----------<  84       [07-04-19 @ 10:31]              31.9     135  |  96  |  37  ----------------------------<  198      [07-04-19 @ 10:31]  4.3   |  26  |  1.72        Ca     9.4     [07-04-19 @ 10:31]    TPro  7.0  /  Alb  3.1  /  TBili  1.0  /  DBili  0.4  /  AST  34  /  ALT  21  /  AlkPhos  295  [07-04-19 @ 10:31]    PT/INR: PT 15.0 , INR 1.31       [07-03-19 @ 16:51]  PTT: 103.8      [07-04-19 @ 17:55]      Creatinine Trend:  SCr 1.72 [07-04 @ 10:31]  SCr 1.68 [07-03 @ 16:51]    Urinalysis - [07-28-15 @ 22:30]      Color Yellow / Appearance Clear / SG 1.015 / pH 6.0      Gluc 50 / Ketone Negative  / Bili Negative / Urobili Normal       Blood Small / Protein 100 / Leuk Est Negative / Nitrite Negative      RBC 3-5 / WBC 0-2 / Hyaline  / Gran  / Sq Epi  / Non Sq Epi  / Bacteria       HbA1c 6.1      [07-04-19 @ 13:00]    HCV 10.11, Reactive      [07-04-19 @ 13:02]    < from: CT Chest No Cont (07.03.19 @ 19:34) >  EXAM:  CT CHEST                            PROCEDURE DATE:  07/03/2019            INTERPRETATION:  CLINICAL INFORMATION: Left chest wall pain, cough,   history of metastatic liver cancer    COMPARISON: None.    PROCEDURE:   CT of the Chest was performed without intravenous contrast.  Sagittal and coronal reformats were performed.    FINDINGS:    LUNGS AND AIRWAYS: Patent central airways.  Multiple bilateral pulmonary   nodules, the largest measuring up to 7 mm in the left upper lobe (2:13).   Centimeters nodules are clustered.    PLEURA: No pleural effusion.    MEDIASTINUM AND KATIUSKA: No lymphadenopathy. A few subcentimeter   cardiophrenic lymph nodes.    VESSELS: Within normal limits.    HEART: Heart size is normal. No pericardial effusion. Coronary artery and   aortic valvular calcifications.    CHEST WALL AND LOWER NECK: Bilateral gynecomastia.    VISUALIZED UPPER ABDOMEN: Multiple ill-defined hypodensities throughout   the liver, predominantly involving the right hepatic lobe. Nodular  contour of the liver, which may be secondary to cirrhosis or   pseudocirrhosis. Mild splenomegaly. Trace right upper quadrant free   fluid. A few upper abdominal nodules, for example a 1.3 cm perigastric   nodule (2:99).    BONES: Small lytic lesions in the inferior portion of the right scapula   (3:309), right mid clavicle (3:40), right proximal clavicle (3:56),   posterior left 12th rib, posterior left 10th rib, left 6th rib, right 8th   and right 10th ribs. Degenerative changes.    IMPRESSION:     Osseous lytic lesions, ill-defined hepatic lesions and a few upper   abdominal nodules suspicious for metastatic disease. Lung nodules are   indeterminate, metastasis is a consideration however some of these   nodules are clustered and may represent mucoid impaction.    Mild splenomegaly.    CT of the abdomen recommended for complete evaluation.                ALLIE LOPES M.D., RADIOLOGY RESIDENT  This document has been electronically signed.  JANINE CALI M.D., ATTENDING RADIOLOGIST  This document has been electronically signed. Jul  3 2019  9:22PM        < end of copied text >

## 2019-07-04 NOTE — H&P ADULT - PROBLEM SELECTOR PLAN 4
Hx of cirrhosis >15 year, hx of EtOH and HepC (treated)  - c/w home lasix, spironolactone, propranolol and lactulose

## 2019-07-04 NOTE — PROGRESS NOTE ADULT - SUBJECTIVE AND OBJECTIVE BOX
CHIEF COMPLAINT:Patient is a 68y old  Male who presents with a chief complaint of 68M presenting with progressive sob with cough and pain on inspiration (04 Jul 2019 04:13)    	        PAST MEDICAL & SURGICAL HISTORY:  Lung cancer, primary, with metastasis from lung to other site  Blood clot of common bile duct  Gout  Hypertension  Diabetes  No significant past surgical history          REVIEW OF SYSTEMS:  CONSTITUTIONAL: No fever, weight loss, or fatigue  EYES: No eye pain, visual disturbances, or discharge  NECK: No pain or stiffness  RESPIRATORY: No cough, wheezing, chills or hemoptysis; No Shortness of Breath  CARDIOVASCULAR: No chest pain, palpitations, passing out, dizziness, or leg swelling  GASTROINTESTINAL: No abdominal or epigastric pain. No nausea, vomiting, or hematemesis; No diarrhea or constipation. No melena or hematochezia.  GENITOURINARY: No dysuria, frequency, hematuria, or incontinence  NEUROLOGICAL: No headaches, memory loss, loss of strength, numbness, or tremors  SKIN: No itching, burning, rashes, or lesions   LYMPH Nodes: No enlarged glands  ENDOCRINE: No heat or cold intolerance; No hair loss  MUSCULOSKELETAL: No joint pain or swelling; No muscle, back, or extremity pain    Medications:  MEDICATIONS  (STANDING):  cholecalciferol 2000 Unit(s) Oral daily  cyanocobalamin 1000 MICROGram(s) Oral daily  dextrose 5%. 1000 milliLiter(s) (50 mL/Hr) IV Continuous <Continuous>  dextrose 50% Injectable 12.5 Gram(s) IV Push once  dextrose 50% Injectable 25 Gram(s) IV Push once  dextrose 50% Injectable 25 Gram(s) IV Push once  furosemide    Tablet 40 milliGRAM(s) Oral two times a day  heparin  Infusion.  Unit(s)/Hr (13 mL/Hr) IV Continuous <Continuous>  hydrALAZINE 25 milliGRAM(s) Oral two times a day  insulin glargine Injectable (LANTUS) 30 Unit(s) SubCutaneous at bedtime  insulin lispro (HumaLOG) corrective regimen sliding scale   SubCutaneous three times a day before meals  insulin lispro (HumaLOG) corrective regimen sliding scale   SubCutaneous at bedtime  lactulose Syrup 10 Gram(s) Oral daily  pantoprazole    Tablet 40 milliGRAM(s) Oral before breakfast  propranolol 40 milliGRAM(s) Oral two times a day  spironolactone 50 milliGRAM(s) Oral every 12 hours  traZODone 50 milliGRAM(s) Oral at bedtime    MEDICATIONS  (PRN):  dextrose 40% Gel 15 Gram(s) Oral once PRN Blood Glucose LESS THAN 70 milliGRAM(s)/deciliter  glucagon  Injectable 1 milliGRAM(s) IntraMuscular once PRN Glucose LESS THAN 70 milligrams/deciliter  heparin  Injectable 6000 Unit(s) IV Push every 6 hours PRN For aPTT less than 40  heparin  Injectable 3000 Unit(s) IV Push every 6 hours PRN For aPTT between 40 - 57  oxyCODONE    IR 5 milliGRAM(s) Oral every 4 hours PRN Moderate to Severe Pain    	    PHYSICAL EXAM:  T(C): 36.7 (07-04-19 @ 07:44), Max: 37 (07-03-19 @ 17:56)  HR: 56 (07-04-19 @ 07:44) (51 - 57)  BP: 126/73 (07-04-19 @ 07:44) (110/61 - 132/70)  RR: 16 (07-04-19 @ 07:44) (14 - 19)  SpO2: 98% (07-04-19 @ 07:44) (96% - 100%)  Wt(kg): --  I&O's Summary      Appearance: Normal	  HEENT:   Normal oral mucosa, PERRL, EOMI	  Lymphatic: No lymphadenopathy  Cardiovascular: Normal S1 S2, No JVD, No murmurs, No edema  Respiratory: Lungs clear to auscultation	  Psychiatry: A & O x 3, Mood & affect appropriate  Gastrointestinal:  Soft, Non-tender, + BS	  Skin: No rashes, No ecchymoses, No cyanosis	  Neurologic: Non-focal  Extremities: Normal range of motion, No clubbing, cyanosis or edema  Vascular: Peripheral pulses palpable 2+ bilaterally    TELEMETRY: 	    ECG:  	  RADIOLOGY:  OTHER: 	  	  LABS:	 	    CARDIAC MARKERS:                                10.5   7.3   )-----------( 73       ( 03 Jul 2019 16:51 )             30.8     07-03    133<L>  |  99  |  36<H>  ----------------------------<  315<H>  4.5   |  23  |  1.68<H>    Ca    9.2      03 Jul 2019 16:51    TPro  6.8  /  Alb  3.0<L>  /  TBili  1.2  /  DBili  x   /  AST  41<H>  /  ALT  22  /  AlkPhos  301<H>  07-03    proBNP: Serum Pro-Brain Natriuretic Peptide: 1068 pg/mL (07-03 @ 16:51)    Lipid Profile:   HgA1c:   TSH:

## 2019-07-04 NOTE — H&P ADULT - NSICDXPASTMEDICALHX_GEN_ALL_CORE_FT
PAST MEDICAL HISTORY:  Blood clot of common bile duct     Diabetes     Gout     Hypertension     Lung cancer, primary, with metastasis from lung to other site

## 2019-07-04 NOTE — H&P ADULT - PROBLEM SELECTOR PLAN 2
Reported Stage IV liver ca  - will need day hospitalist to consult outpatient oncologist to determine additional staging and to determine if is candidate for palliative chemotherapy  - met to bone, lung and abdomen. will need to determine other outpatient staging  - likely will need head and abdomen

## 2019-07-04 NOTE — H&P ADULT - NSHPSOCIALHISTORY_GEN_ALL_CORE
, previous polysubstance abuse, previous alcohol dependence but in remission. currently no alcohol, smoking or drugs

## 2019-07-04 NOTE — PROVIDER CONTACT NOTE (OTHER) - ASSESSMENT
labs ordered for 6am, ptt/cbc scheduled for 10:06am, would like to draw with AM labs. pt ao4, vss, denies pain at this time.

## 2019-07-04 NOTE — H&P ADULT - NSHPREVIEWOFSYSTEMS_GEN_ALL_CORE
CONSTITUTIONAL: No fever, no chills  EYES: No eye pain, no visual disturbance  Mouth: no pain in mouth, no cuts  RESPIRATORY: cough+, sob+  CARDIOVASCULAR: cp with inspiration, no palpitations  GASTROINTESTINAL: no abdominal pain, no n/v/d  GENITOURINARY: No dysuria, no hematuria  Heme: No easy bruising, no swelling appreciated in neck/armpit/groind  NEUROLOGICAL: No headaches, no weakness  SKIN: No itching, no rashes  MUSCULOSKELETAL: No joint pain, no joint swelling

## 2019-07-04 NOTE — H&P ADULT - NSHPPHYSICALEXAM_GEN_ALL_CORE
Vital Signs Last 24 Hrs  T(C): 36.9 (03 Jul 2019 19:40), Max: 37 (03 Jul 2019 17:56)  T(F): 98.5 (03 Jul 2019 19:40), Max: 98.6 (03 Jul 2019 17:56)  HR: 51 (04 Jul 2019 01:15) (51 - 57)  BP: 111/60 (04 Jul 2019 01:15) (111/60 - 132/70)  BP(mean): --  RR: 18 (04 Jul 2019 01:15) (16 - 18)  SpO2: 97% (04 Jul 2019 01:15) (96% - 100%) on room air     GENERAL: NAD, catabolic state, laying 30 degrees  HEAD:  Atraumatic, Normocephalic  EYES: EOMI, PERRLA, conjunctiva and sclera clear  Mouth: MMM, no lesions  NECK: Supple, no appreciable masses  Lung: pain with deep inspiration but otherwise full sentences during exam, fine crackles b/l on posterior exam throughout  Chest: S1&S2+, rrr, no m/r/g appreciated, pain over left rib cage to palpation  ABDOMEN: bs+, soft, nt, nd  : No singh catheter, no CVA tenderness  EXTREMITIES:  radial pulse present b/l, PT present b/l, no pitting edema present  Neuro: Alert and appropriate to conversation, no focal deficits in extremities  SKIN: warm and dry, no visible rashes

## 2019-07-04 NOTE — H&P ADULT - NSHPLABSRESULTS_GEN_ALL_CORE
Personally reviewed available labs, imaging and ekg  Labs  CBC Full  -  ( 03 Jul 2019 16:51 )  WBC Count : 7.3 K/uL  RBC Count : 3.01 M/uL  Hemoglobin : 10.5 g/dL  Hematocrit : 30.8 %  Platelet Count - Automated : 73 K/uL  Mean Cell Volume : 102.0 fl  Mean Cell Hemoglobin : 34.9 pg  Mean Cell Hemoglobin Concentration : 34.1 gm/dL  Auto Neutrophil # : x  Auto Lymphocyte # : x  Auto Monocyte # : x  Auto Eosinophil # : x  Auto Basophil # : x  Auto Neutrophil % : x  Auto Lymphocyte % : x  Auto Monocyte % : x  Auto Eosinophil % : x  Auto Basophil % : x    07-03    133<L>  |  99  |  36<H>  ----------------------------<  315<H>  4.5   |  23  |  1.68<H>    Ca    9.2      03 Jul 2019 16:51    TPro  6.8  /  Alb  3.0<L>  /  TBili  1.2  /  DBili  x   /  AST  41<H>  /  ALT  22  /  AlkPhos  301<H>  07-03  PT/INR - ( 03 Jul 2019 16:51 )   PT: 15.0 sec;   INR: 1.31 ratio    PTT - ( 03 Jul 2019 16:51 )  PTT:28.1 sec    POCT Blood Glucose.: 213 mg/dL (04 Jul 2019 01:10)  POCT Blood Glucose.: 230 mg/dL (03 Jul 2019 23:20)    Troponin T, High Sensitivity (07.03.19 @ 16:51)    Troponin T, High Sensitivity Result: 29:   Troponin T, High Sensitivity (07.03.19 @ 21:38)    Troponin T, High Sensitivity Result: 28:   Serum Pro-Brain Natriuretic Peptide (07.03.19 @ 16:51)    Serum Pro-Brain Natriuretic Peptide: 1068 pg/mL    Imaging  < from: CT Chest No Cont (07.03.19 @ 19:34) >    PROCEDURE:   CT of the Chest was performed without intravenous contrast.  Sagittal and coronal reformats were performed.    FINDINGS:    LUNGS AND AIRWAYS: Patent central airways.  Multiple bilateral pulmonary   nodules, the largest measuring up to 7 mm in the left upper lobe (2:13).   Centimeters nodules are clustered.    PLEURA: No pleural effusion.    MEDIASTINUM AND KATIUSKA: No lymphadenopathy. A few subcentimeter   cardiophrenic lymph nodes.    VESSELS: Within normal limits.    HEART: Heart size is normal. No pericardial effusion. Coronary artery and   aortic valvular calcifications.    CHEST WALL AND LOWER NECK: Bilateral gynecomastia.    VISUALIZED UPPER ABDOMEN: Multiple ill-defined hypodensities throughout   the liver, predominantly involving the right hepatic lobe. Nodular  contour of the liver, which may be secondary to cirrhosis or   pseudocirrhosis. Mild splenomegaly. Trace right upper quadrant free   fluid. A few upper abdominal nodules, for example a 1.3 cm perigastric   nodule (2:99).    BONES: Small lytic lesions in the inferior portion of the right scapula   (3:309), right mid clavicle (3:40), right proximal clavicle (3:56),   posterior left 12th rib, posterior left 10th rib, left 6th rib, right 8th   and right 10th ribs. Degenerative changes.    IMPRESSION:   Osseous lytic lesions, ill-defined hepatic lesions and a few upper   abdominal nodules suspicious for metastatic disease. Lung nodules are   indeterminate, metastasis is a consideration however some of these   nodules are clustered and may represent mucoid impaction.    Mild splenomegaly.    CT of the abdomen recommended for complete evaluation.  < end of copied text >    EKG: Sinus bradycardia 52bpm, normal axis, [id number], [id number], [id number], no SAI appreciated

## 2019-07-05 DIAGNOSIS — R91.1 SOLITARY PULMONARY NODULE: ICD-10-CM

## 2019-07-05 DIAGNOSIS — J18.9 PNEUMONIA, UNSPECIFIED ORGANISM: ICD-10-CM

## 2019-07-05 LAB
ALBUMIN SERPL ELPH-MCNC: 2.9 G/DL — LOW (ref 3.3–5)
ALP SERPL-CCNC: 270 U/L — HIGH (ref 40–120)
ALT FLD-CCNC: 21 U/L — SIGNIFICANT CHANGE UP (ref 10–45)
ANION GAP SERPL CALC-SCNC: 12 MMOL/L — SIGNIFICANT CHANGE UP (ref 5–17)
APTT BLD: 61.9 SEC — HIGH (ref 27.5–36.3)
APTT BLD: 62.3 SEC — HIGH (ref 27.5–36.3)
AST SERPL-CCNC: 28 U/L — SIGNIFICANT CHANGE UP (ref 10–40)
BILIRUB SERPL-MCNC: 0.9 MG/DL — SIGNIFICANT CHANGE UP (ref 0.2–1.2)
BUN SERPL-MCNC: 44 MG/DL — HIGH (ref 7–23)
CALCIUM SERPL-MCNC: 8.9 MG/DL — SIGNIFICANT CHANGE UP (ref 8.4–10.5)
CHLORIDE SERPL-SCNC: 91 MMOL/L — LOW (ref 96–108)
CO2 SERPL-SCNC: 25 MMOL/L — SIGNIFICANT CHANGE UP (ref 22–31)
CREAT SERPL-MCNC: 2.02 MG/DL — HIGH (ref 0.5–1.3)
GLUCOSE BLDC GLUCOMTR-MCNC: 140 MG/DL — HIGH (ref 70–99)
GLUCOSE BLDC GLUCOMTR-MCNC: 157 MG/DL — HIGH (ref 70–99)
GLUCOSE BLDC GLUCOMTR-MCNC: 207 MG/DL — HIGH (ref 70–99)
GLUCOSE BLDC GLUCOMTR-MCNC: 256 MG/DL — HIGH (ref 70–99)
GLUCOSE SERPL-MCNC: 125 MG/DL — HIGH (ref 70–99)
HCT VFR BLD CALC: 27 % — LOW (ref 39–50)
HGB BLD-MCNC: 8.9 G/DL — LOW (ref 13–17)
MCHC RBC-ENTMCNC: 32.4 PG — SIGNIFICANT CHANGE UP (ref 27–34)
MCHC RBC-ENTMCNC: 33 GM/DL — SIGNIFICANT CHANGE UP (ref 32–36)
MCV RBC AUTO: 98.2 FL — SIGNIFICANT CHANGE UP (ref 80–100)
PLATELET # BLD AUTO: 62 K/UL — LOW (ref 150–400)
POTASSIUM SERPL-MCNC: 4.6 MMOL/L — SIGNIFICANT CHANGE UP (ref 3.5–5.3)
POTASSIUM SERPL-SCNC: 4.6 MMOL/L — SIGNIFICANT CHANGE UP (ref 3.5–5.3)
PROT SERPL-MCNC: 6.3 G/DL — SIGNIFICANT CHANGE UP (ref 6–8.3)
RAPID RVP RESULT: SIGNIFICANT CHANGE UP
RBC # BLD: 2.75 M/UL — LOW (ref 4.2–5.8)
RBC # FLD: 13.3 % — SIGNIFICANT CHANGE UP (ref 10.3–14.5)
SODIUM SERPL-SCNC: 128 MMOL/L — LOW (ref 135–145)
WBC # BLD: 5.19 K/UL — SIGNIFICANT CHANGE UP (ref 3.8–10.5)
WBC # FLD AUTO: 5.19 K/UL — SIGNIFICANT CHANGE UP (ref 3.8–10.5)

## 2019-07-05 RX ORDER — FUROSEMIDE 40 MG
40 TABLET ORAL ONCE
Refills: 0 | Status: DISCONTINUED | OUTPATIENT
Start: 2019-07-05 | End: 2019-07-05

## 2019-07-05 RX ORDER — AZITHROMYCIN 500 MG/1
500 TABLET, FILM COATED ORAL ONCE
Refills: 0 | Status: COMPLETED | OUTPATIENT
Start: 2019-07-05 | End: 2019-07-05

## 2019-07-05 RX ORDER — AZITHROMYCIN 500 MG/1
500 TABLET, FILM COATED ORAL EVERY 24 HOURS
Refills: 0 | Status: DISCONTINUED | OUTPATIENT
Start: 2019-07-06 | End: 2019-07-08

## 2019-07-05 RX ORDER — AZITHROMYCIN 500 MG/1
TABLET, FILM COATED ORAL
Refills: 0 | Status: DISCONTINUED | OUTPATIENT
Start: 2019-07-05 | End: 2019-07-08

## 2019-07-05 RX ORDER — APIXABAN 2.5 MG/1
5 TABLET, FILM COATED ORAL EVERY 12 HOURS
Refills: 0 | Status: DISCONTINUED | OUTPATIENT
Start: 2019-07-05 | End: 2019-07-09

## 2019-07-05 RX ORDER — SPIRONOLACTONE 25 MG/1
25 TABLET, FILM COATED ORAL DAILY
Refills: 0 | Status: DISCONTINUED | OUTPATIENT
Start: 2019-07-05 | End: 2019-07-09

## 2019-07-05 RX ORDER — FUROSEMIDE 40 MG
40 TABLET ORAL DAILY
Refills: 0 | Status: DISCONTINUED | OUTPATIENT
Start: 2019-07-06 | End: 2019-07-09

## 2019-07-05 RX ORDER — CEFTRIAXONE 500 MG/1
1000 INJECTION, POWDER, FOR SOLUTION INTRAMUSCULAR; INTRAVENOUS EVERY 24 HOURS
Refills: 0 | Status: DISCONTINUED | OUTPATIENT
Start: 2019-07-05 | End: 2019-07-09

## 2019-07-05 RX ADMIN — Medication 2000 UNIT(S): at 11:11

## 2019-07-05 RX ADMIN — LACTULOSE 10 GRAM(S): 10 SOLUTION ORAL at 09:17

## 2019-07-05 RX ADMIN — AZITHROMYCIN 250 MILLIGRAM(S): 500 TABLET, FILM COATED ORAL at 13:47

## 2019-07-05 RX ADMIN — OXYCODONE HYDROCHLORIDE 5 MILLIGRAM(S): 5 TABLET ORAL at 15:25

## 2019-07-05 RX ADMIN — Medication 1: at 16:55

## 2019-07-05 RX ADMIN — OXYCODONE HYDROCHLORIDE 5 MILLIGRAM(S): 5 TABLET ORAL at 14:58

## 2019-07-05 RX ADMIN — Medication 50 MILLIGRAM(S): at 21:48

## 2019-07-05 RX ADMIN — Medication 40 MILLIGRAM(S): at 05:33

## 2019-07-05 RX ADMIN — APIXABAN 5 MILLIGRAM(S): 2.5 TABLET, FILM COATED ORAL at 17:19

## 2019-07-05 RX ADMIN — PREGABALIN 1000 MICROGRAM(S): 225 CAPSULE ORAL at 11:11

## 2019-07-05 RX ADMIN — CEFTRIAXONE 100 MILLIGRAM(S): 500 INJECTION, POWDER, FOR SOLUTION INTRAMUSCULAR; INTRAVENOUS at 17:20

## 2019-07-05 RX ADMIN — Medication 25 MILLIGRAM(S): at 17:19

## 2019-07-05 RX ADMIN — Medication 25 MILLIGRAM(S): at 05:33

## 2019-07-05 RX ADMIN — HEPARIN SODIUM 1100 UNIT(S)/HR: 5000 INJECTION INTRAVENOUS; SUBCUTANEOUS at 01:05

## 2019-07-05 RX ADMIN — SPIRONOLACTONE 50 MILLIGRAM(S): 25 TABLET, FILM COATED ORAL at 05:33

## 2019-07-05 RX ADMIN — HEPARIN SODIUM 1100 UNIT(S)/HR: 5000 INJECTION INTRAVENOUS; SUBCUTANEOUS at 07:20

## 2019-07-05 RX ADMIN — Medication 3: at 12:21

## 2019-07-05 RX ADMIN — APIXABAN 5 MILLIGRAM(S): 2.5 TABLET, FILM COATED ORAL at 09:15

## 2019-07-05 RX ADMIN — INSULIN GLARGINE 30 UNIT(S): 100 INJECTION, SOLUTION SUBCUTANEOUS at 21:48

## 2019-07-05 RX ADMIN — PANTOPRAZOLE SODIUM 40 MILLIGRAM(S): 20 TABLET, DELAYED RELEASE ORAL at 05:34

## 2019-07-05 NOTE — CONSULT NOTE ADULT - PROBLEM SELECTOR RECOMMENDATION 3
Indeterminate, may represent metastatic disease vs. mucous impaction   -f/u with oncology as outpt Stage IV liver CA with metastatic disease to bone  -s/p bone biopsy last week, will f/u outpt with oncolgist  -Resume Eliquis for history of abdominal thrombus   -Overall poor prognosis Stage IV liver CA with metastatic disease to bone  -s/p bone biopsy last week, will f/u outpt with oncolgist  - Eliquis resumed for history of abdominal thrombus   -Overall poor prognosis

## 2019-07-05 NOTE — PROGRESS NOTE ADULT - SUBJECTIVE AND OBJECTIVE BOX
[facility]   [phone]  RENAL FOLLOW UP NOTE  --------------------------------------------------------------------------------  Chief Complaint:    24 hour events/subjective:    still with pain upon deep inspiration chest     PAST HISTORY  --------------------------------------------------------------------------------  No significant changes to PMH, PSH, FHx, SHx, unless otherwise noted    ALLERGIES & MEDICATIONS  --------------------------------------------------------------------------------  Allergies    eggs (Nausea)  No Known Drug Allergies    Intolerances      Standing Inpatient Medications  apixaban 5 milliGRAM(s) Oral every 12 hours  azithromycin  IVPB      cefTRIAXone   IVPB 1000 milliGRAM(s) IV Intermittent every 24 hours  cholecalciferol 2000 Unit(s) Oral daily  cyanocobalamin 1000 MICROGram(s) Oral daily  dextrose 5%. 1000 milliLiter(s) IV Continuous <Continuous>  dextrose 50% Injectable 12.5 Gram(s) IV Push once  dextrose 50% Injectable 25 Gram(s) IV Push once  dextrose 50% Injectable 25 Gram(s) IV Push once  hydrALAZINE 25 milliGRAM(s) Oral two times a day  insulin glargine Injectable (LANTUS) 30 Unit(s) SubCutaneous at bedtime  insulin lispro (HumaLOG) corrective regimen sliding scale   SubCutaneous three times a day before meals  insulin lispro (HumaLOG) corrective regimen sliding scale   SubCutaneous at bedtime  lactulose Syrup 10 Gram(s) Oral daily  pantoprazole    Tablet 40 milliGRAM(s) Oral before breakfast  propranolol 40 milliGRAM(s) Oral two times a day  spironolactone 25 milliGRAM(s) Oral daily  traZODone 50 milliGRAM(s) Oral at bedtime    PRN Inpatient Medications  ALBUTerol    90 MICROgram(s) HFA Inhaler 2 Puff(s) Inhalation every 6 hours PRN  dextrose 40% Gel 15 Gram(s) Oral once PRN  glucagon  Injectable 1 milliGRAM(s) IntraMuscular once PRN  oxyCODONE    IR 5 milliGRAM(s) Oral every 4 hours PRN      REVIEW OF SYSTEMS  --------------------------------------------------------------------------------    Gen: denies fatigue, fevers/chills,  CVS: denies chest pain/palpitations  Resp: denies SOB/Cough  GI: Denies N/V/Abd pain  : Denies dysuria/oliguria/hematuria    All other systems were reviewed and are negative, except as noted.    VITALS/PHYSICAL EXAM  --------------------------------------------------------------------------------  T(C): 36.8 (07-05-19 @ 14:00), Max: 36.8 (07-05-19 @ 14:00)  HR: 58 (07-05-19 @ 14:00) (55 - 76)  BP: 106/62 (07-05-19 @ 14:00) (106/62 - 135/73)  RR: 18 (07-05-19 @ 14:00) (18 - 18)  SpO2: 97% (07-05-19 @ 14:00) (96% - 98%)  Wt(kg): --        07-04-19 @ 07:01  -  07-05-19 @ 07:00  --------------------------------------------------------  IN: 1132 mL / OUT: 1700 mL / NET: -568 mL    07-05-19 @ 07:01  -  07-05-19 @ 15:52  --------------------------------------------------------  IN: 749 mL / OUT: 0 mL / NET: 749 mL      Physical Exam:  	  Gen: Non toxic comfortable appearing   	no jvd   	Pulm: decrease bs  no rales or ronchi or wheezing  	CV: RRR, S1S2; no rub  	Abd: +BS, soft, nontender/nondistended  	: No suprapubic tenderness  	UE: Warm, no cyanosis  no clubbing,  no edema  	LE: Warm, no cyanosis  no clubbing, no edema  	Neuro: alert and oriented. speech coherent   	        LABS/STUDIES  --------------------------------------------------------------------------------              8.9    5.19  >-----------<  62       [07-05-19 @ 09:10]              27.0     128  |  91  |  44  ----------------------------<  125      [07-05-19 @ 06:32]  4.6   |  25  |  2.02        Ca     8.9     [07-05-19 @ 06:32]    TPro  6.3  /  Alb  2.9  /  TBili  0.9  /  DBili  x   /  AST  28  /  ALT  21  /  AlkPhos  270  [07-05-19 @ 06:32]    PT/INR: PT 15.0 , INR 1.31       [07-03-19 @ 16:51]  PTT: 61.9       [07-05-19 @ 06:49]      Creatinine Trend:  SCr 2.02 [07-05 @ 06:32]  SCr 1.72 [07-04 @ 10:31]  SCr 1.68 [07-03 @ 16:51]                  HbA1c 6.1      [07-04-19 @ 13:00]

## 2019-07-05 NOTE — PROVIDER CONTACT NOTE (CRITICAL VALUE NOTIFICATION) - NS PROVIDER READ BACK TO LAB
yes
I will SWITCH the dose or number of times a day I take the medications listed below when I get home from the hospital:  None

## 2019-07-05 NOTE — CONSULT NOTE ADULT - PROBLEM SELECTOR RECOMMENDATION 4
Indeterminate, may represent metastatic disease vs. mucous impaction   -f/u with oncology as outpt Indeterminate, may represent metastatic disease vs. mucous impaction

## 2019-07-05 NOTE — PROGRESS NOTE ADULT - ASSESSMENT
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  Plan: suspect from metastasis   - CT chest w/o contrast documents new lung nodules unknown to patient or family. mets to ribs previously identified.  v.q neg  onc eval noted  stop heparin and resume eliquis          : Liver cancer, primary, with metastasis from liver to other site.     Stage IV liver ca  onc eval noted  - met to bone, lung and abdomen.      ·  Problem: Thrombosis.  Plan: Hx of abdominal vessel thrombosis however patient unsure of type  a/c       ·  Problem: Hepatic cirrhosis, unspecified hepatic cirrhosis type, unspecified whether ascites present.  : Hx of cirrhosis >15 year, hx of EtOH and HepC (treated)  - c/w, spironolactone, propranolol and lactulose.       ·  Problem: Diabetes mellitus.  Plan: DM2 on inslulin  c.w  lantus  - carb consistent diet.       Problem: Essential hypertension.   Plan: - c/w home antihypertensives with hold parameters.      ·  Problem: Stage 3 chronic kidney disease.  renal eval noted  cr slightly higher  will dec lasix to daily   - avoid nephrotoxic agents.   d.c planning  pt

## 2019-07-05 NOTE — CONSULT NOTE ADULT - PROBLEM SELECTOR RECOMMENDATION 2
Stage IV liver CA with metastatic disease to bone  -s/p bone biopsy last week, will f/u outpt with oncolgist  -Resume Eliquis for history of abdominal thrombus   -Overall poor prognosis Lingular PNA  -Start CAP treatement with Ceftriaxone and azithromycin  -Check sputum culture, RVP, urine legionella  -CBC with diff in AM Lingular infiltrate w peribronchiolar cuffing  -Start CAP treatement with Ceftriaxone and azithromycin  -Check sputum culture, RVP, urine legionella  -CBC with diff in AM

## 2019-07-05 NOTE — PROGRESS NOTE ADULT - ASSESSMENT
68M w/ Stage IV liver ca ([id number]) w/ new met to rib/pelvis/L femur s/p multiple ablation ([facility] 2017), cirrhosis w/ esophageal varices (hx of EtOH and HepC s/p treatment), abdominal vessel thrombosis previously on apixiban (stopped approx 2 week prior,) DM2 on insulin, CKD, HTN presents to [facility] for evaluation of with progressive sob with cough and pain on inspiration.       1- CKD III suspcted  2- hx edema   3- pain upon inspiration/sob suspected P.E. vs. bony lesion pains  4- HTN       cr suspected to be baseline  given cr higher decrease lasix to 40 mg qd and aldactone 25mg daily   given hyponatremia as well. if na remains low then to have urine lytes   cont hydralazine 25 mg tid   v/q scan very low prob P.E.

## 2019-07-05 NOTE — PROGRESS NOTE ADULT - SUBJECTIVE AND OBJECTIVE BOX
hpi  68 year old man with met hepatocellular carcinoma s/p bone biopsy was off anticoagulation presented with dyspnea and bone pain started IV heparin  pmh sh fh unchanged 7 pt ros pain under control with oxycodone  physical  comfortable  vs 98 70 121/71 18 98 sat  lungs clear  cor s1s2  abd soft distended ascites  ext no edema    data ptt 62 HIT ab negative cr 2.0 dopplers neg DVT  v/q scan low prob PE

## 2019-07-05 NOTE — CONSULT NOTE ADULT - ASSESSMENT
69 y/o M with PMH of Stage IV Liver CA (diagnosed 2017), newly diagnosed osseus metastatic disease to rib/pelvis/L femur (s/p biopsy last week), cirrhosis w/ esophageal varices (hx of EtOH and HepC s/p treatment), abdominal vessel thrombosis (previously on Eliquis, stopped 2 weeks ago for biopsy) DM2 on insulin, CKD, HTN presents 7/4 with 2 week history of increased productive cough (white-yellow sputum) with pleuritic left sided CP that began on 7/1. VQ scan with low probability PE, VA duplex negative for DVT.

## 2019-07-05 NOTE — PROGRESS NOTE ADULT - ASSESSMENT
hepatocellular carcinoma s/p biopsy was off a/c developed bone pain and dyspnea no evidence of dvt or PE  no objection to d/c home on eliquis as before  f/u dr marquez for bone biopsy results  oxycodone for pain

## 2019-07-05 NOTE — CONSULT NOTE ADULT - SUBJECTIVE AND OBJECTIVE BOX
PULMONARY CONSULT    HPI: 69 y/o M with PMH of Stage IV Liver CA (diagnosed 2017), newly diagnosed osseus metastatic disease to rib/pelvis/L femur (s/p biopsy last week), cirrhosis w/ esophageal varices (hx of EtOH and HepC s/p treatment), abdominal vessel thrombosis (previously on Eliquis, stopped 2 weeks ago for biopsy) DM2 on insulin, CKD, HTN presents 7/4 with 2 week history of increased productive cough (white-yellow sputum) with pleuritic left sided CP that began on 7/1. VQ scan with low probability PE, VA duplex negative for DVT. Currently 98% on RA. CT chest with pulmonary nodules and mucous impaction. Denies fever/chills, hemoptysis.     PAST MEDICAL & SURGICAL HISTORY:  Lung cancer, primary, with metastasis from lung to other site  Blood clot of common bile duct  Gout  Hypertension  Diabetes  No significant past surgical history    Allergies    eggs (Nausea)  No Known Drug Allergies    Intolerances      FAMILY HISTORY:  FH: pancreatic cancer    Social history: Former smoker     Review of Systems:  CONSTITUTIONAL: No fever, chills, or fatigue  EYES: No eye pain, visual disturbances, or discharge  ENMT:  No difficulty hearing, tinnitus, vertigo; No sinus or throat pain  NECK: No pain or stiffness  RESPIRATORY: Per above  CARDIOVASCULAR: No chest pain, palpitations, dizziness, or leg swelling  GASTROINTESTINAL: No abdominal or epigastric pain. No nausea, vomiting, or hematemesis; No diarrhea or constipation. No melena or hematochezia.  GENITOURINARY: No dysuria, frequency, hematuria, or incontinence  NEUROLOGICAL: No headaches, memory loss, loss of strength, numbness, or tremors  SKIN: No itching, burning, rashes, or lesions   MUSCULOSKELETAL: No joint pain or swelling; No muscle, back, or extremity pain  PSYCHIATRIC: No depression, anxiety, mood swings, or difficulty sleeping      Medications:  MEDICATIONS  (STANDING):  apixaban 5 milliGRAM(s) Oral every 12 hours  cholecalciferol 2000 Unit(s) Oral daily  cyanocobalamin 1000 MICROGram(s) Oral daily  dextrose 5%. 1000 milliLiter(s) (50 mL/Hr) IV Continuous <Continuous>  dextrose 50% Injectable 12.5 Gram(s) IV Push once  dextrose 50% Injectable 25 Gram(s) IV Push once  dextrose 50% Injectable 25 Gram(s) IV Push once  hydrALAZINE 25 milliGRAM(s) Oral two times a day  insulin glargine Injectable (LANTUS) 30 Unit(s) SubCutaneous at bedtime  insulin lispro (HumaLOG) corrective regimen sliding scale   SubCutaneous three times a day before meals  insulin lispro (HumaLOG) corrective regimen sliding scale   SubCutaneous at bedtime  lactulose Syrup 10 Gram(s) Oral daily  pantoprazole    Tablet 40 milliGRAM(s) Oral before breakfast  propranolol 40 milliGRAM(s) Oral two times a day  spironolactone 25 milliGRAM(s) Oral daily  traZODone 50 milliGRAM(s) Oral at bedtime    MEDICATIONS  (PRN):  ALBUTerol    90 MICROgram(s) HFA Inhaler 2 Puff(s) Inhalation every 6 hours PRN Shortness of Breath  dextrose 40% Gel 15 Gram(s) Oral once PRN Blood Glucose LESS THAN 70 milliGRAM(s)/deciliter  glucagon  Injectable 1 milliGRAM(s) IntraMuscular once PRN Glucose LESS THAN 70 milligrams/deciliter  oxyCODONE    IR 5 milliGRAM(s) Oral every 4 hours PRN Moderate to Severe Pain            Vital Signs Last 24 Hrs  T(C): 36.7 (05 Jul 2019 04:08), Max: 36.7 (04 Jul 2019 20:28)  T(F): 98 (05 Jul 2019 04:08), Max: 98 (04 Jul 2019 20:28)  HR: 70 (05 Jul 2019 04:08) (55 - 76)  BP: 121/71 (05 Jul 2019 04:08) (114/61 - 135/73)  BP(mean): --  RR: 18 (05 Jul 2019 04:08) (18 - 18)  SpO2: 98% (05 Jul 2019 04:08) (96% - 98%) on RA            07-04 @ 07:01  -  07-05 @ 07:00  --------------------------------------------------------  IN: 1132 mL / OUT: 1700 mL / NET: -568 mL          LABS:                        8.9    5.19  )-----------( 62       ( 05 Jul 2019 09:10 )             27.0     07-05    128<L>  |  91<L>  |  44<H>  ----------------------------<  125<H>  4.6   |  25  |  2.02<H>    Ca    8.9      05 Jul 2019 06:32    TPro  6.3  /  Alb  2.9<L>  /  TBili  0.9  /  DBili  x   /  AST  28  /  ALT  21  /  AlkPhos  270<H>  07-05          CAPILLARY BLOOD GLUCOSE      POCT Blood Glucose.: 256 mg/dL (05 Jul 2019 11:52)    PT/INR - ( 03 Jul 2019 16:51 )   PT: 15.0 sec;   INR: 1.31 ratio         PTT - ( 05 Jul 2019 06:49 )  PTT:61.9 sec      Serum Pro-Brain Natriuretic Peptide: 1068 pg/mL (07-03-19 @ 16:51)      HIT ab Negative 07-04 @ 13:55  HIT ab EIA --        Physical Examination:    General: No acute distress.      HEENT: Pupils equal, reactive to light.  Symmetric.    PULM: Crackles bibasilar     CVS: S1, S2    ABD: Soft, distended, nontender, normoactive bowel sounds, no masses    EXT: No edema, nontender    SKIN: Warm and well perfused, no rashes noted.    NEURO: Alert, oriented, interactive, nonfocal    RADIOLOGY REVIEWED  CT chest: < from: CT Chest No Cont (07.03.19 @ 19:34) >  FINDINGS:    LUNGS AND AIRWAYS: Patent central airways.  Multiple bilateral pulmonary   nodules, the largest measuring up to 7 mm in the left upper lobe (2:13).   Centimeters nodules are clustered.    PLEURA: No pleural effusion.    MEDIASTINUM AND KATIUSKA: No lymphadenopathy. A few subcentimeter   cardiophrenic lymph nodes.    VESSELS: Within normal limits.    HEART: Heart size is normal. No pericardial effusion. Coronary artery and   aortic valvular calcifications.    CHEST WALL AND LOWER NECK: Bilateral gynecomastia.    VISUALIZED UPPER ABDOMEN: Multiple ill-defined hypodensities throughout   the liver, predominantly involving the right hepatic lobe. Nodular  contour of the liver, which may be secondary to cirrhosis or   pseudocirrhosis. Mild splenomegaly. Trace right upper quadrant free   fluid. A few upper abdominal nodules, for example a 1.3 cm perigastric   nodule (2:99).    BONES: Small lytic lesions in the inferior portion of the right scapula   (3:309), right mid clavicle (3:40), right proximal clavicle (3:56),   posterior left 12th rib, posterior left 10th rib, left 6th rib, right 8th   and right 10th ribs. Degenerative changes.    IMPRESSION:     Osseous lytic lesions, ill-defined hepatic lesions and a few upper   abdominal nodules suspicious for metastatic disease. Lung nodules are   indeterminate, metastasis is a consideration however some of these   nodules are clustered and may represent mucoid impaction.    < end of copied text > PULMONARY CONSULT    HPI: 67 y/o M with PMH of Stage IV Liver CA (diagnosed 2017), newly diagnosed osseus metastatic disease to rib/pelvis/L femur (s/p biopsy last week), cirrhosis w/ esophageal varices (hx of EtOH and HepC s/p treatment), abdominal vessel thrombosis (previously on Eliquis, stopped 2 weeks ago for biopsy) DM2 on insulin, CKD, HTN presents 7/4 with 2 week history of increased productive cough (white-yellow sputum) with pleuritic left sided CP that began on 7/1. VQ scan with low probability PE, VA duplex negative for DVT. Currently 98% on RA. CT chest with pulmonary nodules and mucous impaction. Denies fever/chills, hemoptysis.     PAST MEDICAL & SURGICAL HISTORY:  Blood clot of common bile duct  Gout  Hypertension  Diabetes  No significant past surgical history    Allergies    eggs (Nausea)  No Known Drug Allergies    Intolerances      FAMILY HISTORY:  FH: pancreatic cancer    Social history: 1/2ppd x 45y quit 4 y ago       Review of Systems:  CONSTITUTIONAL: No fever, chills, or fatigue  EYES: No eye pain, visual disturbances, or discharge  ENMT:  No difficulty hearing, tinnitus, vertigo; No sinus or throat pain  NECK: No pain or stiffness  RESPIRATORY: Per above  CARDIOVASCULAR: No chest pain, palpitations, dizziness, or leg swelling  GASTROINTESTINAL: No abdominal or epigastric pain. No nausea, vomiting, or hematemesis; No diarrhea or constipation. No melena or hematochezia.  GENITOURINARY: No dysuria, frequency, hematuria, or incontinence  NEUROLOGICAL: No headaches, memory loss, loss of strength, numbness, or tremors  SKIN: No itching, burning, rashes, or lesions   MUSCULOSKELETAL: + left chest pain, No joint pain or swelling; No muscle, back, or extremity pain  PSYCHIATRIC: No depression, anxiety, mood swings, or difficulty sleeping      Medications:  MEDICATIONS  (STANDING):  apixaban 5 milliGRAM(s) Oral every 12 hours  cholecalciferol 2000 Unit(s) Oral daily  cyanocobalamin 1000 MICROGram(s) Oral daily  dextrose 5%. 1000 milliLiter(s) (50 mL/Hr) IV Continuous <Continuous>  dextrose 50% Injectable 12.5 Gram(s) IV Push once  dextrose 50% Injectable 25 Gram(s) IV Push once  dextrose 50% Injectable 25 Gram(s) IV Push once  hydrALAZINE 25 milliGRAM(s) Oral two times a day  insulin glargine Injectable (LANTUS) 30 Unit(s) SubCutaneous at bedtime  insulin lispro (HumaLOG) corrective regimen sliding scale   SubCutaneous three times a day before meals  insulin lispro (HumaLOG) corrective regimen sliding scale   SubCutaneous at bedtime  lactulose Syrup 10 Gram(s) Oral daily  pantoprazole    Tablet 40 milliGRAM(s) Oral before breakfast  propranolol 40 milliGRAM(s) Oral two times a day  spironolactone 25 milliGRAM(s) Oral daily  traZODone 50 milliGRAM(s) Oral at bedtime    MEDICATIONS  (PRN):  ALBUTerol    90 MICROgram(s) HFA Inhaler 2 Puff(s) Inhalation every 6 hours PRN Shortness of Breath  dextrose 40% Gel 15 Gram(s) Oral once PRN Blood Glucose LESS THAN 70 milliGRAM(s)/deciliter  glucagon  Injectable 1 milliGRAM(s) IntraMuscular once PRN Glucose LESS THAN 70 milligrams/deciliter  oxyCODONE    IR 5 milliGRAM(s) Oral every 4 hours PRN Moderate to Severe Pain            Vital Signs Last 24 Hrs  T(C): 36.7 (05 Jul 2019 04:08), Max: 36.7 (04 Jul 2019 20:28)  T(F): 98 (05 Jul 2019 04:08), Max: 98 (04 Jul 2019 20:28)  HR: 70 (05 Jul 2019 04:08) (55 - 76)  BP: 121/71 (05 Jul 2019 04:08) (114/61 - 135/73)  BP(mean): --  RR: 18 (05 Jul 2019 04:08) (18 - 18)  SpO2: 98% (05 Jul 2019 04:08) (96% - 98%) on RA            07-04 @ 07:01  -  07-05 @ 07:00  --------------------------------------------------------  IN: 1132 mL / OUT: 1700 mL / NET: -568 mL          LABS:                        8.9    5.19  )-----------( 62       ( 05 Jul 2019 09:10 )             27.0     07-05    128<L>  |  91<L>  |  44<H>  ----------------------------<  125<H>  4.6   |  25  |  2.02<H>    Ca    8.9      05 Jul 2019 06:32    TPro  6.3  /  Alb  2.9<L>  /  TBili  0.9  /  DBili  x   /  AST  28  /  ALT  21  /  AlkPhos  270<H>  07-05          CAPILLARY BLOOD GLUCOSE      POCT Blood Glucose.: 256 mg/dL (05 Jul 2019 11:52)    PT/INR - ( 03 Jul 2019 16:51 )   PT: 15.0 sec;   INR: 1.31 ratio         PTT - ( 05 Jul 2019 06:49 )  PTT:61.9 sec      Serum Pro-Brain Natriuretic Peptide: 1068 pg/mL (07-03-19 @ 16:51)      HIT ab Negative 07-04 @ 13:55  HIT ab EIA --        Physical Examination:    General: No acute distress.      HEENT: Pupils equal, reactive to light.  Symmetric.    PULM: Crackles bibasilar     CVS: rrr    ABD: Soft, distended, nontender, normoactive bowel sounds, no masses    EXT: No edema, nontender    SKIN: Warm and well perfused, no rashes noted.    NEURO: Alert, oriented, interactive, nonfocal    RADIOLOGY REVIEWED  CT chest: < from: CT Chest No Cont (07.03.19 @ 19:34) >  FINDINGS:    LUNGS AND AIRWAYS: Patent central airways.  Multiple bilateral pulmonary   nodules, the largest measuring up to 7 mm in the left upper lobe (2:13).   Centimeters nodules are clustered.    PLEURA: No pleural effusion.    MEDIASTINUM AND KATIUSKA: No lymphadenopathy. A few subcentimeter   cardiophrenic lymph nodes.    VESSELS: Within normal limits.    HEART: Heart size is normal. No pericardial effusion. Coronary artery and   aortic valvular calcifications.    CHEST WALL AND LOWER NECK: Bilateral gynecomastia.    VISUALIZED UPPER ABDOMEN: Multiple ill-defined hypodensities throughout   the liver, predominantly involving the right hepatic lobe. Nodular  contour of the liver, which may be secondary to cirrhosis or   pseudocirrhosis. Mild splenomegaly. Trace right upper quadrant free   fluid. A few upper abdominal nodules, for example a 1.3 cm perigastric   nodule (2:99).    BONES: Small lytic lesions in the inferior portion of the right scapula   (3:309), right mid clavicle (3:40), right proximal clavicle (3:56),   posterior left 12th rib, posterior left 10th rib, left 6th rib, right 8th   and right 10th ribs. Degenerative changes.    IMPRESSION: Lingula small infiltrate at pleural surface, peribronchiolar cuffing    Osseous lytic lesions, ill-defined hepatic lesions and a few upper   abdominal nodules suspicious for metastatic disease. Lung nodules are   indeterminate, metastasis is a consideration however some of these   nodules are clustered and may represent mucoid impaction.

## 2019-07-05 NOTE — CHART NOTE - NSCHARTNOTEFT_GEN_A_CORE
Stopped heparin and resumed home dose Eliquis for abd vessel thrombosis per Hem/Onc and Dr. Carrion    [id number]

## 2019-07-06 DIAGNOSIS — E87.1 HYPO-OSMOLALITY AND HYPONATREMIA: ICD-10-CM

## 2019-07-06 LAB
ANION GAP SERPL CALC-SCNC: 10 MMOL/L — SIGNIFICANT CHANGE UP (ref 5–17)
BASOPHILS # BLD AUTO: 0.03 K/UL — SIGNIFICANT CHANGE UP (ref 0–0.2)
BASOPHILS NFR BLD AUTO: 0.9 % — SIGNIFICANT CHANGE UP (ref 0–2)
BUN SERPL-MCNC: 48 MG/DL — HIGH (ref 7–23)
CALCIUM SERPL-MCNC: 8.6 MG/DL — SIGNIFICANT CHANGE UP (ref 8.4–10.5)
CHLORIDE SERPL-SCNC: 96 MMOL/L — SIGNIFICANT CHANGE UP (ref 96–108)
CO2 SERPL-SCNC: 24 MMOL/L — SIGNIFICANT CHANGE UP (ref 22–31)
CREAT SERPL-MCNC: 1.83 MG/DL — HIGH (ref 0.5–1.3)
ELLIPTOCYTES BLD QL SMEAR: SIGNIFICANT CHANGE UP
EOSINOPHIL # BLD AUTO: 0.07 K/UL — SIGNIFICANT CHANGE UP (ref 0–0.5)
EOSINOPHIL NFR BLD AUTO: 2 % — SIGNIFICANT CHANGE UP (ref 0–6)
GLUCOSE BLDC GLUCOMTR-MCNC: 200 MG/DL — HIGH (ref 70–99)
GLUCOSE BLDC GLUCOMTR-MCNC: 207 MG/DL — HIGH (ref 70–99)
GLUCOSE BLDC GLUCOMTR-MCNC: 253 MG/DL — HIGH (ref 70–99)
GLUCOSE BLDC GLUCOMTR-MCNC: 286 MG/DL — HIGH (ref 70–99)
GLUCOSE SERPL-MCNC: 274 MG/DL — HIGH (ref 70–99)
GRAM STN FLD: SIGNIFICANT CHANGE UP
HCT VFR BLD CALC: 24.4 % — LOW (ref 39–50)
HGB BLD-MCNC: 8 G/DL — LOW (ref 13–17)
IMM GRANULOCYTES NFR BLD AUTO: 0.3 % — SIGNIFICANT CHANGE UP (ref 0–1.5)
LYMPHOCYTES # BLD AUTO: 0.67 K/UL — LOW (ref 1–3.3)
LYMPHOCYTES # BLD AUTO: 19.5 % — SIGNIFICANT CHANGE UP (ref 13–44)
MANUAL SMEAR VERIFICATION: SIGNIFICANT CHANGE UP
MCHC RBC-ENTMCNC: 32.5 PG — SIGNIFICANT CHANGE UP (ref 27–34)
MCHC RBC-ENTMCNC: 32.8 GM/DL — SIGNIFICANT CHANGE UP (ref 32–36)
MCV RBC AUTO: 99.2 FL — SIGNIFICANT CHANGE UP (ref 80–100)
MICROCYTES BLD QL: SLIGHT — SIGNIFICANT CHANGE UP
MONOCYTES # BLD AUTO: 0.48 K/UL — SIGNIFICANT CHANGE UP (ref 0–0.9)
MONOCYTES NFR BLD AUTO: 14 % — SIGNIFICANT CHANGE UP (ref 2–14)
NEUTROPHILS # BLD AUTO: 2.18 K/UL — SIGNIFICANT CHANGE UP (ref 1.8–7.4)
NEUTROPHILS NFR BLD AUTO: 63.3 % — SIGNIFICANT CHANGE UP (ref 43–77)
PLAT MORPH BLD: NORMAL — SIGNIFICANT CHANGE UP
PLATELET # BLD AUTO: 58 K/UL — LOW (ref 150–400)
POIKILOCYTOSIS BLD QL AUTO: SIGNIFICANT CHANGE UP
POLYCHROMASIA BLD QL SMEAR: SLIGHT — SIGNIFICANT CHANGE UP
POTASSIUM SERPL-MCNC: 4 MMOL/L — SIGNIFICANT CHANGE UP (ref 3.5–5.3)
POTASSIUM SERPL-SCNC: 4 MMOL/L — SIGNIFICANT CHANGE UP (ref 3.5–5.3)
RBC # BLD: 2.46 M/UL — LOW (ref 4.2–5.8)
RBC # FLD: 13.2 % — SIGNIFICANT CHANGE UP (ref 10.3–14.5)
RBC BLD AUTO: ABNORMAL
SCHISTOCYTES BLD QL AUTO: SLIGHT — SIGNIFICANT CHANGE UP
SODIUM SERPL-SCNC: 130 MMOL/L — LOW (ref 135–145)
SPECIMEN SOURCE: SIGNIFICANT CHANGE UP
URATE SERPL-MCNC: 8.8 MG/DL — SIGNIFICANT CHANGE UP (ref 3.4–8.8)
WBC # BLD: 3.44 K/UL — LOW (ref 3.8–10.5)
WBC # FLD AUTO: 3.44 K/UL — LOW (ref 3.8–10.5)

## 2019-07-06 RX ADMIN — Medication 2000 UNIT(S): at 09:36

## 2019-07-06 RX ADMIN — CEFTRIAXONE 100 MILLIGRAM(S): 500 INJECTION, POWDER, FOR SOLUTION INTRAMUSCULAR; INTRAVENOUS at 16:51

## 2019-07-06 RX ADMIN — APIXABAN 5 MILLIGRAM(S): 2.5 TABLET, FILM COATED ORAL at 16:51

## 2019-07-06 RX ADMIN — OXYCODONE HYDROCHLORIDE 5 MILLIGRAM(S): 5 TABLET ORAL at 09:36

## 2019-07-06 RX ADMIN — Medication 1: at 21:43

## 2019-07-06 RX ADMIN — AZITHROMYCIN 250 MILLIGRAM(S): 500 TABLET, FILM COATED ORAL at 12:25

## 2019-07-06 RX ADMIN — LACTULOSE 10 GRAM(S): 10 SOLUTION ORAL at 08:37

## 2019-07-06 RX ADMIN — APIXABAN 5 MILLIGRAM(S): 2.5 TABLET, FILM COATED ORAL at 06:05

## 2019-07-06 RX ADMIN — Medication 3: at 16:51

## 2019-07-06 RX ADMIN — SPIRONOLACTONE 25 MILLIGRAM(S): 25 TABLET, FILM COATED ORAL at 06:05

## 2019-07-06 RX ADMIN — OXYCODONE HYDROCHLORIDE 5 MILLIGRAM(S): 5 TABLET ORAL at 11:13

## 2019-07-06 RX ADMIN — Medication 1: at 08:15

## 2019-07-06 RX ADMIN — PANTOPRAZOLE SODIUM 40 MILLIGRAM(S): 20 TABLET, DELAYED RELEASE ORAL at 06:06

## 2019-07-06 RX ADMIN — Medication 40 MILLIGRAM(S): at 06:05

## 2019-07-06 RX ADMIN — Medication 25 MILLIGRAM(S): at 06:05

## 2019-07-06 RX ADMIN — Medication 50 MILLIGRAM(S): at 21:42

## 2019-07-06 RX ADMIN — OXYCODONE HYDROCHLORIDE 5 MILLIGRAM(S): 5 TABLET ORAL at 00:25

## 2019-07-06 RX ADMIN — OXYCODONE HYDROCHLORIDE 5 MILLIGRAM(S): 5 TABLET ORAL at 21:39

## 2019-07-06 RX ADMIN — PREGABALIN 1000 MICROGRAM(S): 225 CAPSULE ORAL at 09:36

## 2019-07-06 RX ADMIN — Medication 2: at 12:25

## 2019-07-06 RX ADMIN — OXYCODONE HYDROCHLORIDE 5 MILLIGRAM(S): 5 TABLET ORAL at 01:00

## 2019-07-06 RX ADMIN — OXYCODONE HYDROCHLORIDE 5 MILLIGRAM(S): 5 TABLET ORAL at 20:52

## 2019-07-06 RX ADMIN — Medication 25 MILLIGRAM(S): at 16:52

## 2019-07-06 RX ADMIN — INSULIN GLARGINE 30 UNIT(S): 100 INJECTION, SOLUTION SUBCUTANEOUS at 21:43

## 2019-07-06 RX ADMIN — Medication 600 MILLIGRAM(S): at 16:51

## 2019-07-06 NOTE — PROGRESS NOTE ADULT - SUBJECTIVE AND OBJECTIVE BOX
RAVINDRA DE LA PAZ  MRN-[id number]    Patient is a 68y old  Male who presents with a chief complaint of 68M presenting with progressive sob with cough and pain on inspiration (06 Jul 2019 15:28)      Review of System    Resting comfortably    Current Meds  MEDICATIONS  (STANDING):  apixaban 5 milliGRAM(s) Oral every 12 hours  azithromycin  IVPB      azithromycin  IVPB 500 milliGRAM(s) IV Intermittent every 24 hours  cefTRIAXone   IVPB 1000 milliGRAM(s) IV Intermittent every 24 hours  cholecalciferol 2000 Unit(s) Oral daily  cyanocobalamin 1000 MICROGram(s) Oral daily  dextrose 5%. 1000 milliLiter(s) (50 mL/Hr) IV Continuous <Continuous>  dextrose 50% Injectable 12.5 Gram(s) IV Push once  dextrose 50% Injectable 25 Gram(s) IV Push once  dextrose 50% Injectable 25 Gram(s) IV Push once  furosemide    Tablet 40 milliGRAM(s) Oral daily  guaiFENesin  milliGRAM(s) Oral every 12 hours  hydrALAZINE 25 milliGRAM(s) Oral two times a day  insulin glargine Injectable (LANTUS) 30 Unit(s) SubCutaneous at bedtime  insulin lispro (HumaLOG) corrective regimen sliding scale   SubCutaneous three times a day before meals  insulin lispro (HumaLOG) corrective regimen sliding scale   SubCutaneous at bedtime  lactulose Syrup 10 Gram(s) Oral daily  pantoprazole    Tablet 40 milliGRAM(s) Oral before breakfast  propranolol 40 milliGRAM(s) Oral two times a day  spironolactone 25 milliGRAM(s) Oral daily  traZODone 50 milliGRAM(s) Oral at bedtime    MEDICATIONS  (PRN):  ALBUTerol    90 MICROgram(s) HFA Inhaler 2 Puff(s) Inhalation every 6 hours PRN Shortness of Breath  dextrose 40% Gel 15 Gram(s) Oral once PRN Blood Glucose LESS THAN 70 milliGRAM(s)/deciliter  glucagon  Injectable 1 milliGRAM(s) IntraMuscular once PRN Glucose LESS THAN 70 milligrams/deciliter  oxyCODONE    IR 5 milliGRAM(s) Oral every 4 hours PRN Moderate to Severe Pain      Vitals  Vital Signs Last 24 Hrs  T(C): 36.9 (06 Jul 2019 20:49), Max: 36.9 (06 Jul 2019 04:08)  T(F): 98.4 (06 Jul 2019 20:49), Max: 98.4 (06 Jul 2019 04:08)  HR: 61 (06 Jul 2019 20:49) (57 - 70)  BP: 127/75 (06 Jul 2019 20:49) (106/51 - 127/75)  BP(mean): --  RR: 18 (06 Jul 2019 20:49) (18 - 18)  SpO2: 98% (06 Jul 2019 20:49) (96% - 98%)    Physical Exam      Constitutional: NAD    Lab  CBC Full  -  ( 06 Jul 2019 11:06 )  WBC Count : 3.44 K/uL  RBC Count : 2.46 M/uL  Hemoglobin : 8.0 g/dL  Hematocrit : 24.4 %  Platelet Count - Automated : 58 K/uL  Mean Cell Volume : 99.2 fl  Mean Cell Hemoglobin : 32.5 pg  Mean Cell Hemoglobin Concentration : 32.8 gm/dL  Auto Neutrophil # : 2.18 K/uL  Auto Lymphocyte # : 0.67 K/uL  Auto Monocyte # : 0.48 K/uL  Auto Eosinophil # : 0.07 K/uL  Auto Basophil # : 0.03 K/uL  Auto Neutrophil % : 63.3 %  Auto Lymphocyte % : 19.5 %  Auto Monocyte % : 14.0 %  Auto Eosinophil % : 2.0 %  Auto Basophil % : 0.9 %    07-06    130<L>  |  96  |  48<H>  ----------------------------<  274<H>  4.0   |  24  |  1.83<H>    Ca    8.6      06 Jul 2019 05:42    TPro  6.3  /  Alb  2.9<L>  /  TBili  0.9  /  DBili  x   /  AST  28  /  ALT  21  /  AlkPhos  270<H>  07-05    PTT - ( 05 Jul 2019 06:49 )  PTT:61.9 sec    Rad:    Assessment/Plan

## 2019-07-06 NOTE — PROGRESS NOTE ADULT - ASSESSMENT
68M w/ Stage IV liver ca ([id number]) w/ elevated creatinine, hyponatremia, new met to rib/pelvis/L femur s/p multiple ablation ([facility] 2017), cirrhosis w/ esophageal varices (hx of EtOH and HepC s/p treatment), abdominal vessel thrombosis previously on apixiban (stopped approx 2 week prior,) DM2 on insulin, CKD, HTN presents to [facility] for evaluation of with progressive sob with cough / pleuritic chest pain-- v/q scan very low probability PE.

## 2019-07-06 NOTE — PROGRESS NOTE ADULT - ASSESSMENT
69 y/o male  w/ Stage IV liver ca ([id number]) w/ new mets to rib/pelvis/L femur s/p multiple ablation ([facility] 2017), cirrhosis w/ esophageal varices (hx of EtOH and HepC s/p treatment), abdominal vessel thrombosis previously on apixiban (stopped approx 2 week prior,) DM2 on insulin, CKD, HTN presents for evaluation of pleuritic chest pain suspected multifactorial from new mets to ribs and to lung however cannot rule out PE       ·  Problem: Pleuritis.    mets  - CT chest w/o contrast documents new lung nodules unknown to patient or family. mets to ribs previously identified.  v.q neg  onc eval noted  c/w eliquis  pulm eval noted  pna  abs as per pulm           : Liver cancer, primary, with metastasis from liver to other site.     Stage IV liver ca  onc eval noted  - met to bone, lung and abdomen.      ·  Problem: Thrombosis.  Plan: Hx of abdominal vessel thrombosis however patient unsure of type  a/c       ·  Problem: Hepatic cirrhosis, unspecified hepatic cirrhosis type, unspecified whether ascites present.  : Hx of cirrhosis >15 year, hx of EtOH and HepC (treated)  - c/w, spironolactone, propranolol and lactulose.       ·  Problem: Diabetes mellitus.  Plan: DM2 on inslulin  c.w  lantus  - carb consistent diet.       Problem: Essential hypertension.   Plan: - c/w home antihypertensives with hold parameters.      ·  Problem: Stage 3 chronic kidney disease.  renal eval noted  cr stable   c/w meds  - avoid nephrotoxic agents.   d.c planning when cleared by pulm/ heme  pt

## 2019-07-06 NOTE — PROGRESS NOTE ADULT - ASSESSMENT
hepatocellular carcinoma s/p biopsy was off a/c developed bone pain and dyspnea no evidence of dvt or PE  f/u dr marquez for bone biopsy results  opain mgmt.    h/h trending down. h/o varicies.  Will check guaiac.  Transfusional support as needed

## 2019-07-06 NOTE — PHYSICAL THERAPY INITIAL EVALUATION ADULT - ADDITIONAL COMMENTS
Osseous lytic lesions, ill-defined hepatic lesions and a few upper abdominal nodules suspicious for metastatic disease. pt lives in a co-op appt w/ spouse 13STE, +HR, Independent in functional ambulation, occ assistance for ADLs from spouse, pt has RW and SC used them for functional mobility.

## 2019-07-06 NOTE — PHYSICAL THERAPY INITIAL EVALUATION ADULT - DISCHARGE DISPOSITION, PT EVAL
home w/ home PT/home w/assist of family and home PT for safety assessment and to improve his strength, balance, and maximize his functional independence in ADls and mobility

## 2019-07-06 NOTE — PROGRESS NOTE ADULT - SUBJECTIVE AND OBJECTIVE BOX
Follow-up Pulm Progress Note    Pleuritic CP much improved today  C/o congested cough  Sats 95% RA    Medications:  MEDICATIONS  (STANDING):  apixaban 5 milliGRAM(s) Oral every 12 hours  azithromycin  IVPB      azithromycin  IVPB 500 milliGRAM(s) IV Intermittent every 24 hours  cefTRIAXone   IVPB 1000 milliGRAM(s) IV Intermittent every 24 hours  cholecalciferol 2000 Unit(s) Oral daily  cyanocobalamin 1000 MICROGram(s) Oral daily  dextrose 5%. 1000 milliLiter(s) (50 mL/Hr) IV Continuous <Continuous>  dextrose 50% Injectable 12.5 Gram(s) IV Push once  dextrose 50% Injectable 25 Gram(s) IV Push once  dextrose 50% Injectable 25 Gram(s) IV Push once  furosemide    Tablet 40 milliGRAM(s) Oral daily  guaiFENesin  milliGRAM(s) Oral every 12 hours  hydrALAZINE 25 milliGRAM(s) Oral two times a day  insulin glargine Injectable (LANTUS) 30 Unit(s) SubCutaneous at bedtime  insulin lispro (HumaLOG) corrective regimen sliding scale   SubCutaneous three times a day before meals  insulin lispro (HumaLOG) corrective regimen sliding scale   SubCutaneous at bedtime  lactulose Syrup 10 Gram(s) Oral daily  pantoprazole    Tablet 40 milliGRAM(s) Oral before breakfast  propranolol 40 milliGRAM(s) Oral two times a day  spironolactone 25 milliGRAM(s) Oral daily  traZODone 50 milliGRAM(s) Oral at bedtime    MEDICATIONS  (PRN):  ALBUTerol    90 MICROgram(s) HFA Inhaler 2 Puff(s) Inhalation every 6 hours PRN Shortness of Breath  dextrose 40% Gel 15 Gram(s) Oral once PRN Blood Glucose LESS THAN 70 milliGRAM(s)/deciliter  glucagon  Injectable 1 milliGRAM(s) IntraMuscular once PRN Glucose LESS THAN 70 milligrams/deciliter  oxyCODONE    IR 5 milliGRAM(s) Oral every 4 hours PRN Moderate to Severe Pain          Vital Signs Last 24 Hrs  T(C): 36.7 (06 Jul 2019 11:54), Max: 36.9 (06 Jul 2019 04:08)  T(F): 98 (06 Jul 2019 11:54), Max: 98.4 (06 Jul 2019 04:08)  HR: 57 (06 Jul 2019 11:54) (51 - 70)  BP: 115/50 (06 Jul 2019 11:54) (106/62 - 123/68)  BP(mean): --  RR: 18 (06 Jul 2019 11:54) (18 - 18)  SpO2: 97% (06 Jul 2019 11:54) (96% - 99%)          07-05 @ 07:01  -  07-06 @ 07:00  --------------------------------------------------------  IN: 1289 mL / OUT: 400 mL / NET: 889 mL          LABS:                        8.0    3.44  )-----------( 58       ( 06 Jul 2019 11:06 )             24.4     07-06    130<L>  |  96  |  48<H>  ----------------------------<  274<H>  4.0   |  24  |  1.83<H>    Ca    8.6      06 Jul 2019 05:42    TPro  6.3  /  Alb  2.9<L>  /  TBili  0.9  /  DBili  x   /  AST  28  /  ALT  21  /  AlkPhos  270<H>  07-05          CAPILLARY BLOOD GLUCOSE      POCT Blood Glucose.: 207 mg/dL (06 Jul 2019 11:54)    PTT - ( 05 Jul 2019 06:49 )  PTT:61.9 sec      Serum Pro-Brain Natriuretic Peptide: 1068 pg/mL (07-03-19 @ 16:51)      HIT ab Negative 07-04 @ 13:55  HIT ab EIA --        Physical Examination:  PULM: bibasilar crackles  CVS: RRR    RADIOLOGY REVIEWED  CT chest: < from: CT Chest No Cont (07.03.19 @ 19:34) >  FINDINGS:    LUNGS AND AIRWAYS: Patent central airways.  Multiple bilateral pulmonary   nodules, the largest measuring up to 7 mm in the left upper lobe (2:13).   Centimeters nodules are clustered.    PLEURA: No pleural effusion.    MEDIASTINUM AND KATIUSKA: No lymphadenopathy. A few subcentimeter   cardiophrenic lymph nodes.    VESSELS: Within normal limits.    HEART: Heart size is normal. No pericardial effusion. Coronary artery and   aortic valvular calcifications.    CHEST WALL AND LOWER NECK: Bilateral gynecomastia.    VISUALIZED UPPER ABDOMEN: Multiple ill-defined hypodensities throughout   the liver, predominantly involving the right hepatic lobe. Nodular  contour of the liver, which may be secondary to cirrhosis or   pseudocirrhosis. Mild splenomegaly. Trace right upper quadrant free   fluid. A few upper abdominal nodules, for example a 1.3 cm perigastric   nodule (2:99).    BONES: Small lytic lesions in the inferior portion of the right scapula   (3:309), right mid clavicle (3:40), right proximal clavicle (3:56),   posterior left 12th rib, posterior left 10th rib, left 6th rib, right 8th   and right 10th ribs. Degenerative changes.    IMPRESSION:     Osseous lytic lesions, ill-defined hepatic lesions and a few upper   abdominal nodules suspicious for metastatic disease. Lung nodules are   indeterminate, metastasis is a consideration however some of these   nodules are clustered and may represent mucoid impaction.    Mild splenomegaly.

## 2019-07-06 NOTE — PROGRESS NOTE ADULT - SUBJECTIVE AND OBJECTIVE BOX
CHIEF COMPLAINT:Patient is a 68y old  Male who presents with a chief complaint of 68M presenting with progressive sob with cough and pain on inspiration (05 Jul 2019 15:52)    	        PAST MEDICAL & SURGICAL HISTORY:  Lung cancer, primary, with metastasis from lung to other site  Blood clot of common bile duct  Gout  Hypertension  Diabetes  No significant past surgical history          REVIEW OF SYSTEMS:  CONSTITUTIONAL:feels better   EYES: No eye pain, visual disturbances, or discharge  NECK: No pain or stiffness  RESPIRATORY: pleuritic pain w. inspiration   CARDIOVASCULAR: No chest pain, palpitations, passing out, dizziness,   GASTROINTESTINAL: No abdominal or epigastric pain. No nausea, vomiting, or hematemesis; No diarrhea or constipation. No melena or hematochezia.  GENITOURINARY: No dysuria, frequency, hematuria, or incontinence  NEUROLOGICAL: No headaches,     Medications:  MEDICATIONS  (STANDING):  apixaban 5 milliGRAM(s) Oral every 12 hours  azithromycin  IVPB      azithromycin  IVPB 500 milliGRAM(s) IV Intermittent every 24 hours  cefTRIAXone   IVPB 1000 milliGRAM(s) IV Intermittent every 24 hours  cholecalciferol 2000 Unit(s) Oral daily  cyanocobalamin 1000 MICROGram(s) Oral daily  dextrose 5%. 1000 milliLiter(s) (50 mL/Hr) IV Continuous <Continuous>  dextrose 50% Injectable 12.5 Gram(s) IV Push once  dextrose 50% Injectable 25 Gram(s) IV Push once  dextrose 50% Injectable 25 Gram(s) IV Push once  furosemide    Tablet 40 milliGRAM(s) Oral daily  hydrALAZINE 25 milliGRAM(s) Oral two times a day  insulin glargine Injectable (LANTUS) 30 Unit(s) SubCutaneous at bedtime  insulin lispro (HumaLOG) corrective regimen sliding scale   SubCutaneous three times a day before meals  insulin lispro (HumaLOG) corrective regimen sliding scale   SubCutaneous at bedtime  lactulose Syrup 10 Gram(s) Oral daily  pantoprazole    Tablet 40 milliGRAM(s) Oral before breakfast  propranolol 40 milliGRAM(s) Oral two times a day  spironolactone 25 milliGRAM(s) Oral daily  traZODone 50 milliGRAM(s) Oral at bedtime    MEDICATIONS  (PRN):  ALBUTerol    90 MICROgram(s) HFA Inhaler 2 Puff(s) Inhalation every 6 hours PRN Shortness of Breath  dextrose 40% Gel 15 Gram(s) Oral once PRN Blood Glucose LESS THAN 70 milliGRAM(s)/deciliter  glucagon  Injectable 1 milliGRAM(s) IntraMuscular once PRN Glucose LESS THAN 70 milligrams/deciliter  oxyCODONE    IR 5 milliGRAM(s) Oral every 4 hours PRN Moderate to Severe Pain    	    PHYSICAL EXAM:  T(C): 36.7 (07-06-19 @ 11:54), Max: 36.9 (07-06-19 @ 04:08)  HR: 57 (07-06-19 @ 11:54) (51 - 70)  BP: 115/50 (07-06-19 @ 11:54) (106/62 - 123/68)  RR: 18 (07-06-19 @ 11:54) (18 - 18)  SpO2: 97% (07-06-19 @ 11:54) (96% - 99%)  Wt(kg): --  I&O's Summary    05 Jul 2019 07:01  -  06 Jul 2019 07:00  --------------------------------------------------------  IN: 1289 mL / OUT: 400 mL / NET: 889 mL        Appearance: Normal	  HEENT:   Normal oral mucosa, PERRL, EOMI	  Lymphatic: No lymphadenopathy  Cardiovascular: Normal S1 S2, No JVD, No murmurs,   Respiratory: dec bs   Psychiatry: A & O x 3,  Gastrointestinal:  Soft, Non-tender, + BS	  Skin: No rashes, No ecchymoses, No cyanosis	  Neurologic: Non-focal  Extremities: Normal range of motion, No clubbing, cyanosis or edema  Vascular: Peripheral pulses palpable 2+ bilaterally    TELEMETRY: 	    ECG:  	  RADIOLOGY:  OTHER: 	  	  LABS:	 	    CARDIAC MARKERS:                                8.0    3.44  )-----------( 58       ( 06 Jul 2019 11:06 )             24.4     07-06    130<L>  |  96  |  48<H>  ----------------------------<  274<H>  4.0   |  24  |  1.83<H>    Ca    8.6      06 Jul 2019 05:42    TPro  6.3  /  Alb  2.9<L>  /  TBili  0.9  /  DBili  x   /  AST  28  /  ALT  21  /  AlkPhos  270<H>  07-05    proBNP:   Lipid Profile:   HgA1c:   TSH:

## 2019-07-06 NOTE — PHYSICAL THERAPY INITIAL EVALUATION ADULT - PERTINENT HX OF CURRENT PROBLEM, REHAB EVAL
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

## 2019-07-06 NOTE — PHYSICAL THERAPY INITIAL EVALUATION ADULT - PRECAUTIONS/LIMITATIONS, REHAB EVAL
Lab shows: decreased h/h, CT chest: Osseous lytic lesions, ill-defined hepatic lesions and a few upper abdominal nodules suspicious for metastatic disease./fall precautions

## 2019-07-06 NOTE — PROGRESS NOTE ADULT - SUBJECTIVE AND OBJECTIVE BOX
[facility]   [phone]  Dr. Garcia (covering for Dr. Ascencio)  RENAL FOLLOW UP NOTE  --------------------------------------------------------------------------------  Patient seen and examined.  c/o of persistent though decreased left sided pleuritic pain.    PAST HISTORY  --------------------------------------------------------------------------------  No significant changes to PMH, PSH, FHx, SHx, unless otherwise noted    ALLERGIES & MEDICATIONS  --------------------------------------------------------------------------------  Allergies    eggs (Nausea)  No Known Drug Allergies    Intolerances      Standing Inpatient Medications  apixaban 5 milliGRAM(s) Oral every 12 hours  azithromycin  IVPB      azithromycin  IVPB 500 milliGRAM(s) IV Intermittent every 24 hours  cefTRIAXone   IVPB 1000 milliGRAM(s) IV Intermittent every 24 hours  cholecalciferol 2000 Unit(s) Oral daily  cyanocobalamin 1000 MICROGram(s) Oral daily  dextrose 5%. 1000 milliLiter(s) IV Continuous <Continuous>  dextrose 50% Injectable 12.5 Gram(s) IV Push once  dextrose 50% Injectable 25 Gram(s) IV Push once  dextrose 50% Injectable 25 Gram(s) IV Push once  furosemide    Tablet 40 milliGRAM(s) Oral daily  guaiFENesin  milliGRAM(s) Oral every 12 hours  hydrALAZINE 25 milliGRAM(s) Oral two times a day  insulin glargine Injectable (LANTUS) 30 Unit(s) SubCutaneous at bedtime  insulin lispro (HumaLOG) corrective regimen sliding scale   SubCutaneous three times a day before meals  insulin lispro (HumaLOG) corrective regimen sliding scale   SubCutaneous at bedtime  lactulose Syrup 10 Gram(s) Oral daily  pantoprazole    Tablet 40 milliGRAM(s) Oral before breakfast  propranolol 40 milliGRAM(s) Oral two times a day  spironolactone 25 milliGRAM(s) Oral daily  traZODone 50 milliGRAM(s) Oral at bedtime    PRN Inpatient Medications  ALBUTerol    90 MICROgram(s) HFA Inhaler 2 Puff(s) Inhalation every 6 hours PRN  dextrose 40% Gel 15 Gram(s) Oral once PRN  glucagon  Injectable 1 milliGRAM(s) IntraMuscular once PRN  oxyCODONE    IR 5 milliGRAM(s) Oral every 4 hours PRN      FOCUSED REVIEW OF SYSTEMS  --------------------------------------------------------------------------------  denies fevers/rigors  denies SOB/cough  +left sided pleuritic CP  Denies N/V/abd pain.  no BM today, 3BM yday  denies oliguria/dysuria/hematuria      VITALS/PHYSICAL EXAM  --------------------------------------------------------------------------------  T(C): 36.7 (07-06-19 @ 11:54), Max: 36.9 (07-06-19 @ 04:08)  HR: 57 (07-06-19 @ 11:54) (51 - 70)  BP: 115/50 (07-06-19 @ 11:54) (106/62 - 123/68)  RR: 18 (07-06-19 @ 11:54) (18 - 18)  SpO2: 97% (07-06-19 @ 11:54) (96% - 99%)  Wt(kg): --        07-05-19 @ 07:01  -  07-06-19 @ 07:00  --------------------------------------------------------  IN: 1289 mL / OUT: 400 mL / NET: 889 mL    07-06-19 @ 07:01  -  07-06-19 @ 15:28  --------------------------------------------------------  IN: 480 mL / OUT: 0 mL / NET: 480 mL      Physical Exam:  	Gen: NAD, well-appearing  	Pulm: CTA B/L  	CV: RRR, S1S2  	Abd: +BS, soft, nontender/nondistended  	: No suprapubic tenderness.  no singh          Extremity: No cyanosis, no edema  	Neuro: A&O x 3      LABS/STUDIES  --------------------------------------------------------------------------------              8.0    3.44  >-----------<  58       [07-06-19 @ 11:06]              24.4     130  |  96  |  48  ----------------------------<  274      [07-06-19 @ 05:42]  4.0   |  24  |  1.83        Ca     8.6     [07-06-19 @ 05:42]    TPro  6.3  /  Alb  2.9  /  TBili  0.9  /  DBili  x   /  AST  28  /  ALT  21  /  AlkPhos  270  [07-05-19 @ 06:32]      PTT: 61.9       [07-05-19 @ 06:49]    Uric acid 8.8      [07-06-19 @ 05:42]    eGFR if Non African American: 37 mL/min/1.73M2 (07-06 @ 05:42)  eGFR if African American: 42 mL/min/1.73M2 (07-06 @ 05:42)    Creatinine Trend:  SCr 1.83 [07-06 @ 05:42]  SCr 2.02 [07-05 @ 06:32]  SCr 1.72 [07-04 @ 10:31]  SCr 1.68 [07-03 @ 16:51]                  HbA1c 6.1      [07-04-19 @ 13:00]

## 2019-07-06 NOTE — PHYSICAL THERAPY INITIAL EVALUATION ADULT - STRENGTHENING, PT EVAL
pt will demonstrate good strength in BLEs/BUe to improve limb stability during functional mobility in 2weeks

## 2019-07-07 LAB
-  AMIKACIN: SIGNIFICANT CHANGE UP
-  AMPICILLIN/SULBACTAM: SIGNIFICANT CHANGE UP
-  AMPICILLIN: SIGNIFICANT CHANGE UP
-  AZTREONAM: SIGNIFICANT CHANGE UP
-  CEFEPIME: SIGNIFICANT CHANGE UP
-  CEFOXITIN: SIGNIFICANT CHANGE UP
-  CEFTRIAXONE: SIGNIFICANT CHANGE UP
-  CIPROFLOXACIN: SIGNIFICANT CHANGE UP
-  ERTAPENEM: SIGNIFICANT CHANGE UP
-  GENTAMICIN: SIGNIFICANT CHANGE UP
-  IMIPENEM: SIGNIFICANT CHANGE UP
-  LEVOFLOXACIN: SIGNIFICANT CHANGE UP
-  MEROPENEM: SIGNIFICANT CHANGE UP
-  PIPERACILLIN/TAZOBACTAM: SIGNIFICANT CHANGE UP
-  TOBRAMYCIN: SIGNIFICANT CHANGE UP
-  TRIMETHOPRIM/SULFAMETHOXAZOLE: SIGNIFICANT CHANGE UP
ANION GAP SERPL CALC-SCNC: 11 MMOL/L — SIGNIFICANT CHANGE UP (ref 5–17)
BUN SERPL-MCNC: 43 MG/DL — HIGH (ref 7–23)
CALCIUM SERPL-MCNC: 8.8 MG/DL — SIGNIFICANT CHANGE UP (ref 8.4–10.5)
CHLORIDE SERPL-SCNC: 99 MMOL/L — SIGNIFICANT CHANGE UP (ref 96–108)
CO2 SERPL-SCNC: 23 MMOL/L — SIGNIFICANT CHANGE UP (ref 22–31)
CREAT SERPL-MCNC: 1.75 MG/DL — HIGH (ref 0.5–1.3)
CULTURE RESULTS: SIGNIFICANT CHANGE UP
GLUCOSE BLDC GLUCOMTR-MCNC: 181 MG/DL — HIGH (ref 70–99)
GLUCOSE BLDC GLUCOMTR-MCNC: 219 MG/DL — HIGH (ref 70–99)
GLUCOSE BLDC GLUCOMTR-MCNC: 259 MG/DL — HIGH (ref 70–99)
GLUCOSE BLDC GLUCOMTR-MCNC: 328 MG/DL — HIGH (ref 70–99)
GLUCOSE SERPL-MCNC: 230 MG/DL — HIGH (ref 70–99)
HCT VFR BLD CALC: 24.6 % — LOW (ref 39–50)
HGB BLD-MCNC: 8 G/DL — LOW (ref 13–17)
MANUAL SMEAR VERIFICATION: SIGNIFICANT CHANGE UP
MCHC RBC-ENTMCNC: 32 PG — SIGNIFICANT CHANGE UP (ref 27–34)
MCHC RBC-ENTMCNC: 32.5 GM/DL — SIGNIFICANT CHANGE UP (ref 32–36)
MCV RBC AUTO: 98.4 FL — SIGNIFICANT CHANGE UP (ref 80–100)
METHOD TYPE: SIGNIFICANT CHANGE UP
OB PNL STL: POSITIVE
ORGANISM # SPEC MICROSCOPIC CNT: SIGNIFICANT CHANGE UP
ORGANISM # SPEC MICROSCOPIC CNT: SIGNIFICANT CHANGE UP
PLAT MORPH BLD: SIGNIFICANT CHANGE UP
PLATELET # BLD AUTO: 60 K/UL — LOW (ref 150–400)
POTASSIUM SERPL-MCNC: 4.2 MMOL/L — SIGNIFICANT CHANGE UP (ref 3.5–5.3)
POTASSIUM SERPL-SCNC: 4.2 MMOL/L — SIGNIFICANT CHANGE UP (ref 3.5–5.3)
RBC # BLD: 2.5 M/UL — LOW (ref 4.2–5.8)
RBC # FLD: 13.2 % — SIGNIFICANT CHANGE UP (ref 10.3–14.5)
RBC BLD AUTO: SIGNIFICANT CHANGE UP
SODIUM SERPL-SCNC: 133 MMOL/L — LOW (ref 135–145)
SPECIMEN SOURCE: SIGNIFICANT CHANGE UP
WBC # BLD: 3.81 K/UL — SIGNIFICANT CHANGE UP (ref 3.8–10.5)
WBC # FLD AUTO: 3.81 K/UL — SIGNIFICANT CHANGE UP (ref 3.8–10.5)

## 2019-07-07 RX ADMIN — CEFTRIAXONE 100 MILLIGRAM(S): 500 INJECTION, POWDER, FOR SOLUTION INTRAMUSCULAR; INTRAVENOUS at 18:12

## 2019-07-07 RX ADMIN — OXYCODONE HYDROCHLORIDE 5 MILLIGRAM(S): 5 TABLET ORAL at 22:05

## 2019-07-07 RX ADMIN — Medication 600 MILLIGRAM(S): at 05:47

## 2019-07-07 RX ADMIN — OXYCODONE HYDROCHLORIDE 5 MILLIGRAM(S): 5 TABLET ORAL at 21:10

## 2019-07-07 RX ADMIN — APIXABAN 5 MILLIGRAM(S): 2.5 TABLET, FILM COATED ORAL at 18:05

## 2019-07-07 RX ADMIN — Medication 4: at 12:53

## 2019-07-07 RX ADMIN — LACTULOSE 10 GRAM(S): 10 SOLUTION ORAL at 09:15

## 2019-07-07 RX ADMIN — Medication 25 MILLIGRAM(S): at 18:05

## 2019-07-07 RX ADMIN — PANTOPRAZOLE SODIUM 40 MILLIGRAM(S): 20 TABLET, DELAYED RELEASE ORAL at 05:48

## 2019-07-07 RX ADMIN — Medication 2: at 17:17

## 2019-07-07 RX ADMIN — AZITHROMYCIN 250 MILLIGRAM(S): 500 TABLET, FILM COATED ORAL at 12:54

## 2019-07-07 RX ADMIN — Medication 50 MILLIGRAM(S): at 21:10

## 2019-07-07 RX ADMIN — SPIRONOLACTONE 25 MILLIGRAM(S): 25 TABLET, FILM COATED ORAL at 05:46

## 2019-07-07 RX ADMIN — Medication 1: at 21:32

## 2019-07-07 RX ADMIN — Medication 1: at 08:22

## 2019-07-07 RX ADMIN — Medication 40 MILLIGRAM(S): at 05:47

## 2019-07-07 RX ADMIN — PREGABALIN 1000 MICROGRAM(S): 225 CAPSULE ORAL at 12:54

## 2019-07-07 RX ADMIN — Medication 40 MILLIGRAM(S): at 18:05

## 2019-07-07 RX ADMIN — Medication 2000 UNIT(S): at 12:54

## 2019-07-07 RX ADMIN — Medication 25 MILLIGRAM(S): at 05:47

## 2019-07-07 RX ADMIN — INSULIN GLARGINE 30 UNIT(S): 100 INJECTION, SOLUTION SUBCUTANEOUS at 21:32

## 2019-07-07 RX ADMIN — APIXABAN 5 MILLIGRAM(S): 2.5 TABLET, FILM COATED ORAL at 05:47

## 2019-07-07 RX ADMIN — Medication 600 MILLIGRAM(S): at 18:05

## 2019-07-07 NOTE — PROGRESS NOTE ADULT - ASSESSMENT
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

## 2019-07-07 NOTE — PROGRESS NOTE ADULT - ASSESSMENT
hepatocellular carcinoma s/p biopsy was off a/c developed bone pain and dyspnea no evidence of dvt or PE  f/u dr marquez for bone biopsy results  pain mgmt.    h/h trending down. h/o varicies.  Will check guaiac.  Transfusional support as needed

## 2019-07-07 NOTE — PROGRESS NOTE ADULT - SUBJECTIVE AND OBJECTIVE BOX
[facility]   [phone]  Dr. Garcia (covering for Dr. Ascencio)  RENAL FOLLOW UP NOTE  --------------------------------------------------------------------------------  Patient seen and examined.  States he feels more energetic today    PAST HISTORY  --------------------------------------------------------------------------------  No significant changes to PMH, PSH, FHx, SHx, unless otherwise noted    ALLERGIES & MEDICATIONS  --------------------------------------------------------------------------------  Allergies    eggs (Nausea)  No Known Drug Allergies    Intolerances      Standing Inpatient Medications  apixaban 5 milliGRAM(s) Oral every 12 hours  azithromycin  IVPB      azithromycin  IVPB 500 milliGRAM(s) IV Intermittent every 24 hours  cefTRIAXone   IVPB 1000 milliGRAM(s) IV Intermittent every 24 hours  cholecalciferol 2000 Unit(s) Oral daily  cyanocobalamin 1000 MICROGram(s) Oral daily  dextrose 5%. 1000 milliLiter(s) IV Continuous <Continuous>  dextrose 50% Injectable 12.5 Gram(s) IV Push once  dextrose 50% Injectable 25 Gram(s) IV Push once  dextrose 50% Injectable 25 Gram(s) IV Push once  furosemide    Tablet 40 milliGRAM(s) Oral daily  guaiFENesin  milliGRAM(s) Oral every 12 hours  hydrALAZINE 25 milliGRAM(s) Oral two times a day  insulin glargine Injectable (LANTUS) 30 Unit(s) SubCutaneous at bedtime  insulin lispro (HumaLOG) corrective regimen sliding scale   SubCutaneous three times a day before meals  insulin lispro (HumaLOG) corrective regimen sliding scale   SubCutaneous at bedtime  lactulose Syrup 10 Gram(s) Oral daily  pantoprazole    Tablet 40 milliGRAM(s) Oral before breakfast  propranolol 40 milliGRAM(s) Oral two times a day  spironolactone 25 milliGRAM(s) Oral daily  traZODone 50 milliGRAM(s) Oral at bedtime    PRN Inpatient Medications  ALBUTerol    90 MICROgram(s) HFA Inhaler 2 Puff(s) Inhalation every 6 hours PRN  dextrose 40% Gel 15 Gram(s) Oral once PRN  glucagon  Injectable 1 milliGRAM(s) IntraMuscular once PRN  oxyCODONE    IR 5 milliGRAM(s) Oral every 4 hours PRN      FOCUSED REVIEW OF SYSTEMS  --------------------------------------------------------------------------------  denies fevers/rigors  denies SOB/cough  +left sided pleuritic CP  Denies N/V/abd pain.   denies oliguria/dysuria/hematuria    VITALS/PHYSICAL EXAM  --------------------------------------------------------------------------------  T(C): 37.1 (07-07-19 @ 12:06), Max: 37.1 (07-07-19 @ 04:45)  HR: 59 (07-07-19 @ 12:06) (59 - 66)  BP: 120/66 (07-07-19 @ 12:06) (106/51 - 127/75)  RR: 18 (07-07-19 @ 12:06) (18 - 18)  SpO2: 98% (07-07-19 @ 12:06) (97% - 98%)  Wt(kg): --        07-06-19 @ 07:01  -  07-07-19 @ 07:00  --------------------------------------------------------  IN: 970 mL / OUT: 400 mL / NET: 570 mL      Physical Exam:  	Gen: NAD, well-appearing  	Pulm: CTA B/L  	CV: RRR, S1S2  	Abd: +BS, soft, NT/ND  	: No suprapubic tenderness.  no singh          Extremity: No cyanosis, no edema  	Neuro: A&O x 3      LABS/STUDIES  --------------------------------------------------------------------------------              8.0    3.81  >-----------<  60       [07-07-19 @ 09:01]              24.6     133  |  99  |  43  ----------------------------<  230      [07-07-19 @ 06:31]  4.2   |  23  |  1.75        Ca     8.8     [07-07-19 @ 06:31]          Uric acid 8.8      [07-06-19 @ 05:42]    eGFR if Non African American: 39 mL/min/1.73M2 (07-07 @ 06:31)  eGFR if African American: 45 mL/min/1.73M2 (07-07 @ 06:31)    Creatinine Trend:  SCr 1.75 [07-07 @ 06:31]  SCr 1.83 [07-06 @ 05:42]  SCr 2.02 [07-05 @ 06:32]  SCr 1.72 [07-04 @ 10:31]  SCr 1.68 [07-03 @ 16:51]                  HbA1c 6.1      [07-04-19 @ 13:00]

## 2019-07-07 NOTE — PROGRESS NOTE ADULT - SUBJECTIVE AND OBJECTIVE BOX
RAVINDRA DE LA PAZ  MRN-[id number]    Patient is a 68y old  Male who presents with a chief complaint of 68M presenting with progressive sob with cough and pain on inspiration (06 Jul 2019 23:25)      ROS  Resting comfortably    Current Meds  MEDICATIONS  (STANDING):  apixaban 5 milliGRAM(s) Oral every 12 hours  azithromycin  IVPB      azithromycin  IVPB 500 milliGRAM(s) IV Intermittent every 24 hours  cefTRIAXone   IVPB 1000 milliGRAM(s) IV Intermittent every 24 hours  cholecalciferol 2000 Unit(s) Oral daily  cyanocobalamin 1000 MICROGram(s) Oral daily  dextrose 5%. 1000 milliLiter(s) (50 mL/Hr) IV Continuous <Continuous>  dextrose 50% Injectable 12.5 Gram(s) IV Push once  dextrose 50% Injectable 25 Gram(s) IV Push once  dextrose 50% Injectable 25 Gram(s) IV Push once  furosemide    Tablet 40 milliGRAM(s) Oral daily  guaiFENesin  milliGRAM(s) Oral every 12 hours  hydrALAZINE 25 milliGRAM(s) Oral two times a day  insulin glargine Injectable (LANTUS) 30 Unit(s) SubCutaneous at bedtime  insulin lispro (HumaLOG) corrective regimen sliding scale   SubCutaneous three times a day before meals  insulin lispro (HumaLOG) corrective regimen sliding scale   SubCutaneous at bedtime  lactulose Syrup 10 Gram(s) Oral daily  pantoprazole    Tablet 40 milliGRAM(s) Oral before breakfast  propranolol 40 milliGRAM(s) Oral two times a day  spironolactone 25 milliGRAM(s) Oral daily  traZODone 50 milliGRAM(s) Oral at bedtime    MEDICATIONS  (PRN):  ALBUTerol    90 MICROgram(s) HFA Inhaler 2 Puff(s) Inhalation every 6 hours PRN Shortness of Breath  dextrose 40% Gel 15 Gram(s) Oral once PRN Blood Glucose LESS THAN 70 milliGRAM(s)/deciliter  glucagon  Injectable 1 milliGRAM(s) IntraMuscular once PRN Glucose LESS THAN 70 milligrams/deciliter  oxyCODONE    IR 5 milliGRAM(s) Oral every 4 hours PRN Moderate to Severe Pain      Vital Signs Last 24 Hrs  T(C): 37.1 (07 Jul 2019 04:45), Max: 37.1 (07 Jul 2019 04:45)  T(F): 98.8 (07 Jul 2019 04:45), Max: 98.8 (07 Jul 2019 04:45)  HR: 66 (07 Jul 2019 05:49) (57 - 66)  BP: 116/66 (07 Jul 2019 05:49) (106/51 - 127/75)  BP(mean): --  RR: 18 (07 Jul 2019 04:45) (18 - 18)  SpO2: 97% (07 Jul 2019 04:45) (96% - 98%)      PHYSICAL EXAM:      Constitutional: NAD        Lab  CBC Full  -  ( 06 Jul 2019 11:06 )  WBC Count : 3.44 K/uL  RBC Count : 2.46 M/uL  Hemoglobin : 8.0 g/dL  Hematocrit : 24.4 %  Platelet Count - Automated : 58 K/uL  Mean Cell Volume : 99.2 fl  Mean Cell Hemoglobin : 32.5 pg  Mean Cell Hemoglobin Concentration : 32.8 gm/dL  Auto Neutrophil # : 2.18 K/uL  Auto Lymphocyte # : 0.67 K/uL  Auto Monocyte # : 0.48 K/uL  Auto Eosinophil # : 0.07 K/uL  Auto Basophil # : 0.03 K/uL  Auto Neutrophil % : 63.3 %  Auto Lymphocyte % : 19.5 %  Auto Monocyte % : 14.0 %  Auto Eosinophil % : 2.0 %  Auto Basophil % : 0.9 %    07-07    133<L>  |  99  |  43<H>  ----------------------------<  230<H>  4.2   |  23  |  1.75<H>    Ca    8.8      07 Jul 2019 06:31          Rad:    Assessment/Plan

## 2019-07-07 NOTE — PROGRESS NOTE ADULT - ASSESSMENT
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    mets  - CT chest w/o contrast documents new lung nodules unknown to patient or family. mets to ribs previously identified.  v.q neg  onc eval noted  c/w eliquis  pulm eval noted  pna  abs as per pulm           : Liver cancer, primary, with metastasis from liver to other site.     Stage IV liver ca  onc eval noted  - met to bone, lung and abdomen.      ·  Problem: Thrombosis.  Plan: Hx of abdominal vessel thrombosis however patient unsure of type  a/c       ·  Problem: Hepatic cirrhosis, unspecified hepatic cirrhosis type, unspecified whether ascites present.  : Hx of cirrhosis >15 year, hx of EtOH and HepC (treated)  - c/w, spironolactone, propranolol and lactulose.       ·  Problem: Diabetes mellitus.  Plan: DM2 on inslulin  c.w  lantus  - carb consistent diet.       Problem: Essential hypertension.   Plan: - c/w home antihypertensives with hold parameters.      ·  Problem: Stage 3 chronic kidney disease.  renal eval noted  cr stable   c/w meds  - avoid nephrotoxic agents.       anemia  dec hg  f/u guaiacs    pt

## 2019-07-07 NOTE — PROGRESS NOTE ADULT - SUBJECTIVE AND OBJECTIVE BOX
CHIEF COMPLAINT:Patient is a 68y old  Male who presents with a chief complaint of 68M presenting with progressive sob with cough and pain on inspiration (07 Jul 2019 08:19)    	        PAST MEDICAL & SURGICAL HISTORY:  Lung cancer, primary, with metastasis from lung to other site  Blood clot of common bile duct  Gout  Hypertension  Diabetes  No significant past surgical history          REVIEW OF SYSTEMS:  CONSTITUTIONAL: feels better  EYES: No eye pain, visual disturbances, or discharge  NECK: No pain or stiffness  RESPIRATORY: less pain on breathing   CARDIOVASCULAR: No chest pain, palpitations, passing out, dizziness, o  GASTROINTESTINAL: No abdominal or epigastric pain. No nausea, vomiting, or hematemesis; No diarrhea or constipation. No melena or hematochezia.  GENITOURINARY: No dysuria, frequency, hematuria, or incontinenc  NEUROLOGICAL: No headaches, memory loss, loss of strength, numbness, or tremors  MUSCULOSKELETAL: No joint pain or swelling; No muscle, back, or extremity pain    Medications:  MEDICATIONS  (STANDING):  apixaban 5 milliGRAM(s) Oral every 12 hours  azithromycin  IVPB      azithromycin  IVPB 500 milliGRAM(s) IV Intermittent every 24 hours  cefTRIAXone   IVPB 1000 milliGRAM(s) IV Intermittent every 24 hours  cholecalciferol 2000 Unit(s) Oral daily  cyanocobalamin 1000 MICROGram(s) Oral daily  dextrose 5%. 1000 milliLiter(s) (50 mL/Hr) IV Continuous <Continuous>  dextrose 50% Injectable 12.5 Gram(s) IV Push once  dextrose 50% Injectable 25 Gram(s) IV Push once  dextrose 50% Injectable 25 Gram(s) IV Push once  furosemide    Tablet 40 milliGRAM(s) Oral daily  guaiFENesin  milliGRAM(s) Oral every 12 hours  hydrALAZINE 25 milliGRAM(s) Oral two times a day  insulin glargine Injectable (LANTUS) 30 Unit(s) SubCutaneous at bedtime  insulin lispro (HumaLOG) corrective regimen sliding scale   SubCutaneous three times a day before meals  insulin lispro (HumaLOG) corrective regimen sliding scale   SubCutaneous at bedtime  lactulose Syrup 10 Gram(s) Oral daily  pantoprazole    Tablet 40 milliGRAM(s) Oral before breakfast  propranolol 40 milliGRAM(s) Oral two times a day  spironolactone 25 milliGRAM(s) Oral daily  traZODone 50 milliGRAM(s) Oral at bedtime    MEDICATIONS  (PRN):  ALBUTerol    90 MICROgram(s) HFA Inhaler 2 Puff(s) Inhalation every 6 hours PRN Shortness of Breath  dextrose 40% Gel 15 Gram(s) Oral once PRN Blood Glucose LESS THAN 70 milliGRAM(s)/deciliter  glucagon  Injectable 1 milliGRAM(s) IntraMuscular once PRN Glucose LESS THAN 70 milligrams/deciliter  oxyCODONE    IR 5 milliGRAM(s) Oral every 4 hours PRN Moderate to Severe Pain    	    PHYSICAL EXAM:  T(C): 37.1 (07-07-19 @ 04:45), Max: 37.1 (07-07-19 @ 04:45)  HR: 66 (07-07-19 @ 05:49) (57 - 66)  BP: 116/66 (07-07-19 @ 05:49) (106/51 - 127/75)  RR: 18 (07-07-19 @ 04:45) (18 - 18)  SpO2: 97% (07-07-19 @ 04:45) (97% - 98%)  Wt(kg): --  I&O's Summary    06 Jul 2019 07:01  -  07 Jul 2019 07:00  --------------------------------------------------------  IN: 970 mL / OUT: 400 mL / NET: 570 mL        Appearance: Normal	  HEENT:   Normal oral mucosa, PERRL, EOMI	  Lymphatic: No lymphadenopathy  Cardiovascular: Normal S1 S2, No JVD, No murmurs, No edema  Respiratory: dec bs 	  Psychiatry: A & O x 3,   Gastrointestinal:  Soft, Non-tender, + BS	  Skin: No rashes, No ecchymoses, No cyanosis	  Neurologic: Non-focal  Extremities: Normal range of motion, No clubbing, cyanosis or edema  Vascular: Peripheral pulses palpable 2+ bilaterally    TELEMETRY: 	    ECG:  	  RADIOLOGY:  OTHER: 	  	  LABS:	 	    CARDIAC MARKERS:                                8.0    3.81  )-----------( 60       ( 07 Jul 2019 09:01 )             24.6     07-07    133<L>  |  99  |  43<H>  ----------------------------<  230<H>  4.2   |  23  |  1.75<H>    Ca    8.8      07 Jul 2019 06:31      proBNP:   Lipid Profile:   HgA1c:   TSH:

## 2019-07-08 LAB
ANION GAP SERPL CALC-SCNC: 11 MMOL/L — SIGNIFICANT CHANGE UP (ref 5–17)
BUN SERPL-MCNC: 42 MG/DL — HIGH (ref 7–23)
CALCIUM SERPL-MCNC: 9.2 MG/DL — SIGNIFICANT CHANGE UP (ref 8.4–10.5)
CHLORIDE SERPL-SCNC: 96 MMOL/L — SIGNIFICANT CHANGE UP (ref 96–108)
CO2 SERPL-SCNC: 24 MMOL/L — SIGNIFICANT CHANGE UP (ref 22–31)
CREAT SERPL-MCNC: 1.65 MG/DL — HIGH (ref 0.5–1.3)
GLUCOSE BLDC GLUCOMTR-MCNC: 152 MG/DL — HIGH (ref 70–99)
GLUCOSE BLDC GLUCOMTR-MCNC: 239 MG/DL — HIGH (ref 70–99)
GLUCOSE BLDC GLUCOMTR-MCNC: 251 MG/DL — HIGH (ref 70–99)
GLUCOSE BLDC GLUCOMTR-MCNC: 307 MG/DL — HIGH (ref 70–99)
GLUCOSE SERPL-MCNC: 185 MG/DL — HIGH (ref 70–99)
HCT VFR BLD CALC: 26.6 % — LOW (ref 39–50)
HGB BLD-MCNC: 9.4 G/DL — LOW (ref 13–17)
MCHC RBC-ENTMCNC: 35.3 GM/DL — SIGNIFICANT CHANGE UP (ref 32–36)
MCHC RBC-ENTMCNC: 35.3 PG — HIGH (ref 27–34)
MCV RBC AUTO: 100 FL — SIGNIFICANT CHANGE UP (ref 80–100)
PLATELET # BLD AUTO: 60 K/UL — LOW (ref 150–400)
POTASSIUM SERPL-MCNC: 4.1 MMOL/L — SIGNIFICANT CHANGE UP (ref 3.5–5.3)
POTASSIUM SERPL-SCNC: 4.1 MMOL/L — SIGNIFICANT CHANGE UP (ref 3.5–5.3)
RBC # BLD: 2.66 M/UL — LOW (ref 4.2–5.8)
RBC # FLD: 12.4 % — SIGNIFICANT CHANGE UP (ref 10.3–14.5)
SODIUM SERPL-SCNC: 131 MMOL/L — LOW (ref 135–145)
WBC # BLD: 4.6 K/UL — SIGNIFICANT CHANGE UP (ref 3.8–10.5)
WBC # FLD AUTO: 4.6 K/UL — SIGNIFICANT CHANGE UP (ref 3.8–10.5)

## 2019-07-08 RX ORDER — INSULIN GLARGINE 100 [IU]/ML
36 INJECTION, SOLUTION SUBCUTANEOUS AT BEDTIME
Refills: 0 | Status: DISCONTINUED | OUTPATIENT
Start: 2019-07-08 | End: 2019-07-09

## 2019-07-08 RX ADMIN — SPIRONOLACTONE 25 MILLIGRAM(S): 25 TABLET, FILM COATED ORAL at 05:19

## 2019-07-08 RX ADMIN — Medication 2: at 21:55

## 2019-07-08 RX ADMIN — PANTOPRAZOLE SODIUM 40 MILLIGRAM(S): 20 TABLET, DELAYED RELEASE ORAL at 06:08

## 2019-07-08 RX ADMIN — Medication 25 MILLIGRAM(S): at 05:19

## 2019-07-08 RX ADMIN — LACTULOSE 10 GRAM(S): 10 SOLUTION ORAL at 09:39

## 2019-07-08 RX ADMIN — CEFTRIAXONE 100 MILLIGRAM(S): 500 INJECTION, POWDER, FOR SOLUTION INTRAMUSCULAR; INTRAVENOUS at 18:28

## 2019-07-08 RX ADMIN — Medication 2: at 12:41

## 2019-07-08 RX ADMIN — Medication 3: at 17:31

## 2019-07-08 RX ADMIN — Medication 600 MILLIGRAM(S): at 17:34

## 2019-07-08 RX ADMIN — Medication 600 MILLIGRAM(S): at 05:19

## 2019-07-08 RX ADMIN — OXYCODONE HYDROCHLORIDE 5 MILLIGRAM(S): 5 TABLET ORAL at 20:38

## 2019-07-08 RX ADMIN — Medication 40 MILLIGRAM(S): at 05:19

## 2019-07-08 RX ADMIN — OXYCODONE HYDROCHLORIDE 5 MILLIGRAM(S): 5 TABLET ORAL at 21:32

## 2019-07-08 RX ADMIN — INSULIN GLARGINE 36 UNIT(S): 100 INJECTION, SOLUTION SUBCUTANEOUS at 21:55

## 2019-07-08 RX ADMIN — Medication 2000 UNIT(S): at 12:41

## 2019-07-08 RX ADMIN — PREGABALIN 1000 MICROGRAM(S): 225 CAPSULE ORAL at 12:41

## 2019-07-08 RX ADMIN — Medication 25 MILLIGRAM(S): at 17:34

## 2019-07-08 RX ADMIN — APIXABAN 5 MILLIGRAM(S): 2.5 TABLET, FILM COATED ORAL at 17:34

## 2019-07-08 RX ADMIN — APIXABAN 5 MILLIGRAM(S): 2.5 TABLET, FILM COATED ORAL at 05:19

## 2019-07-08 RX ADMIN — Medication 1: at 08:14

## 2019-07-08 RX ADMIN — Medication 40 MILLIGRAM(S): at 17:34

## 2019-07-08 RX ADMIN — Medication 50 MILLIGRAM(S): at 21:54

## 2019-07-08 NOTE — PROGRESS NOTE ADULT - SUBJECTIVE AND OBJECTIVE BOX
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 Patient reports having dry cough without hemoptysis over the course of 2-3 weeks with associated sob. Then over last 2d began to develop progressive sharp pain on inspiration. He called his physician who instructed him to go to the ED. He notably was recently taken off of apixiban to bone biopsy of pelvic lesion on 6/28/19 and he did not restart as he was unsure if he was supposed to. No fever, chills, chest pain at rest (only with inspiration), no inability to lay flat, no swelling in lower extremities. His daughter informs that his oncologist informed his wife that he has stage IV disease with need for palliative RT and expected life expectancy of 7mo however patient is unaware of prognostication beyond spread to bones. (04 Jul 2019 04:13)    PAST MEDICAL & SURGICAL HISTORY:  Lung cancer, primary, with metastasis from lung to other site  Blood clot of common bile duct  Gout  Hypertension  Diabetes  No significant past surgical history    Medications:  ALBUTerol    90 MICROgram(s) HFA Inhaler 2 Puff(s) Inhalation every 6 hours PRN Shortness of Breath  apixaban 5 milliGRAM(s) Oral every 12 hours  azithromycin  IVPB      azithromycin  IVPB 500 milliGRAM(s) IV Intermittent every 24 hours  cefTRIAXone   IVPB 1000 milliGRAM(s) IV Intermittent every 24 hours  cholecalciferol 2000 Unit(s) Oral daily  cyanocobalamin 1000 MICROGram(s) Oral daily  dextrose 40% Gel 15 Gram(s) Oral once PRN Blood Glucose LESS THAN 70 milliGRAM(s)/deciliter  dextrose 5%. 1000 milliLiter(s) IV Continuous <Continuous>  dextrose 50% Injectable 12.5 Gram(s) IV Push once  dextrose 50% Injectable 25 Gram(s) IV Push once  dextrose 50% Injectable 25 Gram(s) IV Push once  furosemide    Tablet 40 milliGRAM(s) Oral daily  glucagon  Injectable 1 milliGRAM(s) IntraMuscular once PRN Glucose LESS THAN 70 milligrams/deciliter  guaiFENesin  milliGRAM(s) Oral every 12 hours  hydrALAZINE 25 milliGRAM(s) Oral two times a day  insulin glargine Injectable (LANTUS) 30 Unit(s) SubCutaneous at bedtime  insulin lispro (HumaLOG) corrective regimen sliding scale   SubCutaneous three times a day before meals  insulin lispro (HumaLOG) corrective regimen sliding scale   SubCutaneous at bedtime  lactulose Syrup 10 Gram(s) Oral daily  oxyCODONE    IR 5 milliGRAM(s) Oral every 4 hours PRN Moderate to Severe Pain  pantoprazole    Tablet 40 milliGRAM(s) Oral before breakfast  propranolol 40 milliGRAM(s) Oral two times a day  spironolactone 25 milliGRAM(s) Oral daily  traZODone 50 milliGRAM(s) Oral at bedtime    Labs:  CBC Full  -  ( 08 Jul 2019 05:57 )  WBC Count : 4.6 K/uL  Hemoglobin : 9.4 g/dL  Hematocrit : 26.6 %  Platelet Count - Automated : 60 K/uL  Mean Cell Volume : 100.0 fl  Mean Cell Hemoglobin : 35.3 pg  Mean Cell Hemoglobin Concentration : 35.3 gm/dL  Auto Neutrophil # : x  Auto Lymphocyte # : x  Auto Monocyte # : x  Auto Eosinophil # : x  Auto Basophil # : x  Auto Neutrophil % : x  Auto Lymphocyte % : x  Auto Monocyte % : x  Auto Eosinophil % : x  Auto Basophil % : x    07-08    131<L>  |  96  |  42<H>  ----------------------------<  185<H>  4.1   |  24  |  1.65<H>    Ca    9.2      08 Jul 2019 05:57        Radiology:             ROS:  Patient comfortable without distress  No SOB or chest pain  No palpitation  No abdominal pain, diarrhaea or constipation  No weakness of extremities  No skin changes or swelling of legs    Vital Signs Last 24 Hrs  T(C): 36.8 (08 Jul 2019 04:35), Max: 37.1 (07 Jul 2019 12:06)  T(F): 98.3 (08 Jul 2019 04:35), Max: 98.7 (07 Jul 2019 12:06)  HR: 56 (08 Jul 2019 04:35) (56 - 71)  BP: 105/58 (08 Jul 2019 04:35) (102/60 - 120/66)  BP(mean): --  RR: 18 (08 Jul 2019 04:35) (18 - 18)  SpO2: 97% (08 Jul 2019 04:35) (96% - 98%)    Physical exam:  Patient alert and oriented  No distress  CVS: S1, S2 regular or murmur  Chest: bilateral breath sound without rales  Abdomen: soft, not tender, no organomegaly or masses  No focal neuro deficit  No edema      Assessment and Plan: HPI:  68M w/ Stage IV liver ca ([id number]) w/ new met to rib/pelvis/L femur s/p multiple ablation ([facility] 2017), cirrhosis w/ esophageal varices (hx of EtOH and HepC s/p treatment), 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 He notably was recently taken off of apixiban to bone biopsy of pelvic lesion on 6/28/19 and he did not restart as he was unsure if he was supposed to. No fever, chills, chest pain at rest (only with inspiration). He had been referred for palliative RT to Dr. Allen for hip pain.  In hospital no evidence of DVT or PE.  Getting abx  PAST MEDICAL & SURGICAL HISTORY:  Lung cancer, primary, with metastasis from lung to other site  Blood clot of common bile duct  Gout  Hypertension  Diabetes  No significant past surgical history    Medications:  ALBUTerol    90 MICROgram(s) HFA Inhaler 2 Puff(s) Inhalation every 6 hours PRN Shortness of Breath  apixaban 5 milliGRAM(s) Oral every 12 hours  azithromycin  IVPB      azithromycin  IVPB 500 milliGRAM(s) IV Intermittent every 24 hours  cefTRIAXone   IVPB 1000 milliGRAM(s) IV Intermittent every 24 hours  cholecalciferol 2000 Unit(s) Oral daily  cyanocobalamin 1000 MICROGram(s) Oral daily  dextrose 40% Gel 15 Gram(s) Oral once PRN Blood Glucose LESS THAN 70 milliGRAM(s)/deciliter  dextrose 5%. 1000 milliLiter(s) IV Continuous <Continuous>  dextrose 50% Injectable 12.5 Gram(s) IV Push once  dextrose 50% Injectable 25 Gram(s) IV Push once  dextrose 50% Injectable 25 Gram(s) IV Push once  furosemide    Tablet 40 milliGRAM(s) Oral daily  glucagon  Injectable 1 milliGRAM(s) IntraMuscular once PRN Glucose LESS THAN 70 milligrams/deciliter  guaiFENesin  milliGRAM(s) Oral every 12 hours  hydrALAZINE 25 milliGRAM(s) Oral two times a day  insulin glargine Injectable (LANTUS) 30 Unit(s) SubCutaneous at bedtime  insulin lispro (HumaLOG) corrective regimen sliding scale   SubCutaneous three times a day before meals  insulin lispro (HumaLOG) corrective regimen sliding scale   SubCutaneous at bedtime  lactulose Syrup 10 Gram(s) Oral daily  oxyCODONE    IR 5 milliGRAM(s) Oral every 4 hours PRN Moderate to Severe Pain  pantoprazole    Tablet 40 milliGRAM(s) Oral before breakfast  propranolol 40 milliGRAM(s) Oral two times a day  spironolactone 25 milliGRAM(s) Oral daily  traZODone 50 milliGRAM(s) Oral at bedtime    Labs:  CBC Full  -  ( 08 Jul 2019 05:57 )  WBC Count : 4.6 K/uL  Hemoglobin : 9.4 g/dL  Hematocrit : 26.6 %  Platelet Count - Automated : 60 K/uL  Mean Cell Volume : 100.0 fl  Mean Cell Hemoglobin : 35.3 pg  Mean Cell Hemoglobin Concentration : 35.3 gm/dL  Auto Neutrophil # : x  Auto Lymphocyte # : x  Auto Monocyte # : x  Auto Eosinophil # : x  Auto Basophil # : x  Auto Neutrophil % : x  Auto Lymphocyte % : x  Auto Monocyte % : x  Auto Eosinophil % : x  Auto Basophil % : x    07-08    131<L>  |  96  |  42<H>  ----------------------------<  185<H>  4.1   |  24  |  1.65<H>    Ca    9.2      08 Jul 2019 05:57    Occult Blood, Feces (07.07.19 @ 15:39)    Occult Blood, Feces: Positive        Radiology:     < from: NM Pulmonary Ventilation/Perfusion Scan (07.04.19 @ 19:48) >  Very low probability of pulmonary embolus.    < end of copied text >          ROS:  Patient comfortable without distress  No SOB now. States gets chest pain on left side on breathing in  No palpitation  No abdominal pain, diarrhaea or constipation  No weakness of extremities, but general weakness  No skin changes or swelling of legs    Vital Signs Last 24 Hrs  T(C): 36.8 (08 Jul 2019 04:35), Max: 37.1 (07 Jul 2019 12:06)  T(F): 98.3 (08 Jul 2019 04:35), Max: 98.7 (07 Jul 2019 12:06)  HR: 56 (08 Jul 2019 04:35) (56 - 71)  BP: 105/58 (08 Jul 2019 04:35) (102/60 - 120/66)  BP(mean): --  RR: 18 (08 Jul 2019 04:35) (18 - 18)  SpO2: 97% (08 Jul 2019 04:35) (96% - 98%)    Physical exam:  Patient alert and oriented  No distress  CVS: S1, S2 regular or murmur  Chest: bilateral breath sound without rales  Abdomen: soft, not tender, no organomegaly or masses, obese  No focal neuro deficit  No edema      Assessment and Plan:

## 2019-07-08 NOTE — PROGRESS NOTE ADULT - ASSESSMENT
69 y/o man with above mentioned history.  SOB was not related 2 pulmonary embolism.  He has been seen by pulmonary and started on antibiotic.  CBC as noted. He has CKD. His hemoglobin was somewhat lower than before. He is known to have thrombocytopenia but platelets are safe.  Will follow clinically. Gastroenterology will be seeing him as he has history of masses and had stool guaiac positive. He is on beta blocker.  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 clinically and had bone biopsy the result for which is pending.  However was discharged he will be getting radiation therapy for asymptomatic hip lesion.

## 2019-07-08 NOTE — CONSULT NOTE ADULT - REASON FOR ADMISSION
68M presenting with progressive sob with cough and pain on inspiration

## 2019-07-08 NOTE — PROGRESS NOTE ADULT - ASSESSMENT
67 y/o M with PMH of Stage IV Liver CA (diagnosed 2017), newly diagnosed osseus metastatic disease to rib/pelvis/L femur (s/p biopsy last week), cirrhosis w/ esophageal varices (hx of EtOH and HepC s/p treatment), abdominal vessel thrombosis (previously on Eliquis, stopped 2 weeks ago for biopsy) DM2 on insulin, CKD, HTN presents 7/4 with 2 week history of increased productive cough (white-yellow sputum) with pleuritic left sided CP that began on 7/1. VQ scan with low probability PE, VA duplex negative for DVT.

## 2019-07-08 NOTE — PROGRESS NOTE ADULT - SUBJECTIVE AND OBJECTIVE BOX
CHIEF COMPLAINT:Patient is a 68y old  Male who presents with a chief complaint of 68M presenting with progressive sob with cough and pain on inspiration (08 Jul 2019 09:48)    	        PAST MEDICAL & SURGICAL HISTORY:  Lung cancer, primary, with metastasis from lung to other site  Blood clot of common bile duct  Gout  Hypertension  Diabetes  No significant past surgical history          REVIEW OF SYSTEMS:  CONSTITUTIONAL: feels better  EYES: No eye pain, visual disturbances, or discharge  NECK: No pain or stiffness  RESPIRATORY less pain w/ inspiration  CARDIOVASCULAR: No chest pain, palpitations, passing out, dizziness, or leg swelling  GASTROINTESTINAL: No abdominal or epigastric pain. No nausea, vomiting, or hematemesis; No diarrhea or constipation. No melena or hematochezia.  GENITOURINARY: No dysuria, frequency, hematuria, or incontinence  NEUROLOGICAL: No headaches, memory loss, loss of strength, numbness, or tremors      Medications:  MEDICATIONS  (STANDING):  apixaban 5 milliGRAM(s) Oral every 12 hours  cefTRIAXone   IVPB 1000 milliGRAM(s) IV Intermittent every 24 hours  cholecalciferol 2000 Unit(s) Oral daily  cyanocobalamin 1000 MICROGram(s) Oral daily  dextrose 5%. 1000 milliLiter(s) (50 mL/Hr) IV Continuous <Continuous>  dextrose 50% Injectable 12.5 Gram(s) IV Push once  dextrose 50% Injectable 25 Gram(s) IV Push once  dextrose 50% Injectable 25 Gram(s) IV Push once  furosemide    Tablet 40 milliGRAM(s) Oral daily  guaiFENesin  milliGRAM(s) Oral every 12 hours  hydrALAZINE 25 milliGRAM(s) Oral two times a day  insulin glargine Injectable (LANTUS) 30 Unit(s) SubCutaneous at bedtime  insulin lispro (HumaLOG) corrective regimen sliding scale   SubCutaneous three times a day before meals  insulin lispro (HumaLOG) corrective regimen sliding scale   SubCutaneous at bedtime  lactulose Syrup 10 Gram(s) Oral daily  pantoprazole    Tablet 40 milliGRAM(s) Oral before breakfast  propranolol 40 milliGRAM(s) Oral two times a day  spironolactone 25 milliGRAM(s) Oral daily  traZODone 50 milliGRAM(s) Oral at bedtime    MEDICATIONS  (PRN):  ALBUTerol    90 MICROgram(s) HFA Inhaler 2 Puff(s) Inhalation every 6 hours PRN Shortness of Breath  dextrose 40% Gel 15 Gram(s) Oral once PRN Blood Glucose LESS THAN 70 milliGRAM(s)/deciliter  glucagon  Injectable 1 milliGRAM(s) IntraMuscular once PRN Glucose LESS THAN 70 milligrams/deciliter  oxyCODONE    IR 5 milliGRAM(s) Oral every 4 hours PRN Moderate to Severe Pain    	    PHYSICAL EXAM:  T(C): 36.7 (07-08-19 @ 11:59), Max: 36.8 (07-07-19 @ 21:08)  HR: 58 (07-08-19 @ 11:59) (56 - 71)  BP: 119/66 (07-08-19 @ 11:59) (102/60 - 119/66)  RR: 18 (07-08-19 @ 11:59) (18 - 18)  SpO2: 97% (07-08-19 @ 11:59) (96% - 97%)  Wt(kg): --  I&O's Summary    07 Jul 2019 07:01  -  08 Jul 2019 07:00  --------------------------------------------------------  IN: 1320 mL / OUT: 500 mL / NET: 820 mL        Appearance: Normal	  HEENT:   Normal oral mucosa, PERRL, EOMI	  Lymphatic: No lymphadenopathy  Cardiovascular: Normal S1 S2, No JVD, No murmurs, No edema  Respiratory: dec bs 	  Psychiatry: A & O x 3,  Gastrointestinal:  Soft, Non-tender, + BS	  Skin: No rashes, No ecchymoses, No cyanosis	  Neurologic: Non-focal  Extremities: Normal range of motion, No clubbing, cyanosis or edema  Vascular: Peripheral pulses palpable     TELEMETRY: 	    ECG:  	  RADIOLOGY:  OTHER: 	  	  LABS:	 	    CARDIAC MARKERS:                                9.4    4.6   )-----------( 60       ( 08 Jul 2019 05:57 )             26.6     07-08    131<L>  |  96  |  42<H>  ----------------------------<  185<H>  4.1   |  24  |  1.65<H>    Ca    9.2      08 Jul 2019 05:57      proBNP:   Lipid Profile:   HgA1c:   TSH:

## 2019-07-08 NOTE — PROGRESS NOTE ADULT - ASSESSMENT
69 y/o male  w/ Stage IV liver ca ([id number]) w/ new mets to rib/pelvis/L femur s/p multiple ablation ([facility] 2017), cirrhosis w/ esophageal varices (hx of EtOH and HepC s/p treatment), abdominal vessel thrombosis previously on apixiban (stopped approx 2 week prior,) DM2 on insulin, CKD, HTN presents for evaluation of pleuritic chest pain suspected multifactorial from new mets to ribs and to lung however cannot rule out PE       ·  Problem: Pleuritis.    mets  - CT chest w/o contrast documents new lung nodules   v.q neg  onc eval noted  c/w eliquis  pulm eval noted  pna  abs as per pulm           : Liver cancer, primary, with metastasis from liver to other site.     Stage IV liver ca  onc eval noted  - met to bone, lung and abdomen.      ·  Problem: Thrombosis.  Plan: Hx of abdominal vessel thrombosis however patient unsure of type  a/c       ·  Problem: Hepatic cirrhosis, unspecified hepatic cirrhosis type, unspecified whether ascites present.  : Hx of cirrhosis >15 year, hx of EtOH and HepC (treated)  - c/w, spironolactone, propranolol and lactulose.       ·  Problem: Diabetes mellitus.  Plan: DM2 on inslulin  c.w  lantus  - carb consistent diet.       Problem: Essential hypertension.   Plan: - c/w home antihypertensives with hold parameters.      ·  Problem: Stage 3 chronic kidney disease.  renal eval noted  cr stable   c/w meds  - avoid nephrotoxic agents.       anemia  dec hg  f/u guaiacs pos  hg stable  gi to see     pt

## 2019-07-08 NOTE — PROGRESS NOTE ADULT - ASSESSMENT
68M w/ Stage IV liver ca ([id number]) w/ new met to rib/pelvis/L femur s/p multiple ablation ([facility] 2017), cirrhosis w/ esophageal varices (hx of EtOH and HepC s/p treatment), abdominal vessel thrombosis previously on apixiban (stopped approx 2 week prior,) DM2 on insulin, CKD, HTN presents to [facility] for evaluation of with progressive sob with cough and pain on inspiration.       1- CKD III suspcted  2- hx edema   3- pain upon inspiration/sob suspected  bony lesion pains  4- HTN       cr  baseline   lasix to 40 mg qd and aldactone 25mg daily   na better   cont hydralazine 25 mg tid

## 2019-07-08 NOTE — PROGRESS NOTE ADULT - SUBJECTIVE AND OBJECTIVE BOX
[facility]   [phone]  RENAL FOLLOW UP NOTE  --------------------------------------------------------------------------------  Chief Complaint:    24 hour events/subjective:    seen earlier. states has pain upon deep inspiration persists     PAST HISTORY  --------------------------------------------------------------------------------  No significant changes to PMH, PSH, FHx, SHx, unless otherwise noted    ALLERGIES & MEDICATIONS  --------------------------------------------------------------------------------  Allergies    eggs (Nausea)  No Known Drug Allergies    Intolerances      Standing Inpatient Medications  apixaban 5 milliGRAM(s) Oral every 12 hours  cefTRIAXone   IVPB 1000 milliGRAM(s) IV Intermittent every 24 hours  cholecalciferol 2000 Unit(s) Oral daily  cyanocobalamin 1000 MICROGram(s) Oral daily  dextrose 5%. 1000 milliLiter(s) IV Continuous <Continuous>  dextrose 50% Injectable 12.5 Gram(s) IV Push once  dextrose 50% Injectable 25 Gram(s) IV Push once  dextrose 50% Injectable 25 Gram(s) IV Push once  furosemide    Tablet 40 milliGRAM(s) Oral daily  guaiFENesin  milliGRAM(s) Oral every 12 hours  hydrALAZINE 25 milliGRAM(s) Oral two times a day  insulin glargine Injectable (LANTUS) 36 Unit(s) SubCutaneous at bedtime  insulin lispro (HumaLOG) corrective regimen sliding scale   SubCutaneous three times a day before meals  insulin lispro (HumaLOG) corrective regimen sliding scale   SubCutaneous at bedtime  lactulose Syrup 10 Gram(s) Oral daily  pantoprazole    Tablet 40 milliGRAM(s) Oral before breakfast  propranolol 40 milliGRAM(s) Oral two times a day  spironolactone 25 milliGRAM(s) Oral daily  traZODone 50 milliGRAM(s) Oral at bedtime    PRN Inpatient Medications  ALBUTerol    90 MICROgram(s) HFA Inhaler 2 Puff(s) Inhalation every 6 hours PRN  dextrose 40% Gel 15 Gram(s) Oral once PRN  glucagon  Injectable 1 milliGRAM(s) IntraMuscular once PRN  oxyCODONE    IR 5 milliGRAM(s) Oral every 4 hours PRN      REVIEW OF SYSTEMS  --------------------------------------------------------------------------------    Gen: denies fevers/chills,  CVS: denies chest pain/palpitations but pain upon deep inspiration   Resp: denies SOB/Cough  GI: Denies N/V/Abd pain  : Denies dysuria/oliguria/hematuria    All other systems were reviewed and are negative, except as noted.    VITALS/PHYSICAL EXAM  --------------------------------------------------------------------------------  T(C): 36.7 (07-08-19 @ 21:16), Max: 36.8 (07-08-19 @ 04:35)  HR: 58 (07-08-19 @ 21:16) (56 - 63)  BP: 115/66 (07-08-19 @ 21:16) (105/58 - 119/66)  RR: 18 (07-08-19 @ 21:16) (18 - 18)  SpO2: 96% (07-08-19 @ 21:16) (96% - 97%)  Wt(kg): --        07-07-19 @ 07:01  -  07-08-19 @ 07:00  --------------------------------------------------------  IN: 1320 mL / OUT: 500 mL / NET: 820 mL    07-08-19 @ 07:01  -  07-08-19 @ 23:22  --------------------------------------------------------  IN: 240 mL / OUT: 0 mL / NET: 240 mL      Physical Exam:  	  Gen: Non toxic comfortable appearing   	no jvd   	Pulm: decrease bs  no rales or ronchi or wheezing  	CV: RRR, S1S2; no rub  	Abd: +BS, soft, nontender/nondistended  	: No suprapubic tenderness  	UE: Warm, no cyanosis  no clubbing,  no edema  	LE: Warm, no cyanosis  no clubbing, no edema  	Neuro: alert and oriented. speech coherent         LABS/STUDIES  --------------------------------------------------------------------------------              9.4    4.6   >-----------<  60       [07-08-19 @ 05:57]              26.6     131  |  96  |  42  ----------------------------<  185      [07-08-19 @ 05:57]  4.1   |  24  |  1.65        Ca     9.2     [07-08-19 @ 05:57]            Creatinine Trend:  SCr 1.65 [07-08 @ 05:57]  SCr 1.75 [07-07 @ 06:31]  SCr 1.83 [07-06 @ 05:42]  SCr 2.02 [07-05 @ 06:32]  SCr 1.72 [07-04 @ 10:31]                  HbA1c 6.1      [07-04-19 @ 13:00]

## 2019-07-08 NOTE — PROGRESS NOTE ADULT - SUBJECTIVE AND OBJECTIVE BOX
Follow-up Pulm Progress Note    No new respiratory events overnight.  Denies SOB/CP.   Still c/o L pleuritic CP, but improved  Cough improved  97% on RA    Medications:  MEDICATIONS  (STANDING):  apixaban 5 milliGRAM(s) Oral every 12 hours  cefTRIAXone   IVPB 1000 milliGRAM(s) IV Intermittent every 24 hours  cholecalciferol 2000 Unit(s) Oral daily  cyanocobalamin 1000 MICROGram(s) Oral daily  dextrose 5%. 1000 milliLiter(s) (50 mL/Hr) IV Continuous <Continuous>  dextrose 50% Injectable 12.5 Gram(s) IV Push once  dextrose 50% Injectable 25 Gram(s) IV Push once  dextrose 50% Injectable 25 Gram(s) IV Push once  furosemide    Tablet 40 milliGRAM(s) Oral daily  guaiFENesin  milliGRAM(s) Oral every 12 hours  hydrALAZINE 25 milliGRAM(s) Oral two times a day  insulin glargine Injectable (LANTUS) 30 Unit(s) SubCutaneous at bedtime  insulin lispro (HumaLOG) corrective regimen sliding scale   SubCutaneous three times a day before meals  insulin lispro (HumaLOG) corrective regimen sliding scale   SubCutaneous at bedtime  lactulose Syrup 10 Gram(s) Oral daily  pantoprazole    Tablet 40 milliGRAM(s) Oral before breakfast  propranolol 40 milliGRAM(s) Oral two times a day  spironolactone 25 milliGRAM(s) Oral daily  traZODone 50 milliGRAM(s) Oral at bedtime    MEDICATIONS  (PRN):  ALBUTerol    90 MICROgram(s) HFA Inhaler 2 Puff(s) Inhalation every 6 hours PRN Shortness of Breath  dextrose 40% Gel 15 Gram(s) Oral once PRN Blood Glucose LESS THAN 70 milliGRAM(s)/deciliter  glucagon  Injectable 1 milliGRAM(s) IntraMuscular once PRN Glucose LESS THAN 70 milligrams/deciliter  oxyCODONE    IR 5 milliGRAM(s) Oral every 4 hours PRN Moderate to Severe Pain          Vital Signs Last 24 Hrs  T(C): 36.7 (08 Jul 2019 11:59), Max: 36.8 (07 Jul 2019 21:08)  T(F): 98 (08 Jul 2019 11:59), Max: 98.3 (07 Jul 2019 21:08)  HR: 58 (08 Jul 2019 11:59) (56 - 71)  BP: 119/66 (08 Jul 2019 11:59) (102/60 - 119/66)  BP(mean): --  RR: 18 (08 Jul 2019 11:59) (18 - 18)  SpO2: 97% (08 Jul 2019 11:59) (96% - 97%) on RA          07-07 @ 07:01  -  07-08 @ 07:00  --------------------------------------------------------  IN: 1320 mL / OUT: 500 mL / NET: 820 mL          LABS:                        9.4    4.6   )-----------( 60       ( 08 Jul 2019 05:57 )             26.6     07-08    131<L>  |  96  |  42<H>  ----------------------------<  185<H>  4.1   |  24  |  1.65<H>    Ca    9.2      08 Jul 2019 05:57            CAPILLARY BLOOD GLUCOSE      POCT Blood Glucose.: 239 mg/dL (08 Jul 2019 11:58)              HIT ab Negative 07-04 @ 13:55  HIT ab EIA --              CULTURES: (if applicable)  Culture Results:   Moderate Escherichia coli  Normal Respiratory Esther present (07-05 @ 21:49)    Most recent blood culture -- 07-05 @ 21:49   Escherichia coli Escherichia coli .Sputum 07-05 @ 21:49    Blood culture 07-05 @ 21:49  --    Numerous polymorphonuclear leukocytes per low power field  Rare Squamous epithelial cells per low power field  Numerous Gram Negative Rods seen per oil power field  CHERRY  Escherichia coli  Escherichia coli    Urine culture    -->      Physical Examination:  PULM: Clear to auscultation bilaterally, no significant sputum production  CVS: S1, S2 heard    RADIOLOGY REVIEWED  CT chest: Scattered pulm nodules  Lingular infiltrate

## 2019-07-08 NOTE — CONSULT NOTE ADULT - SUBJECTIVE AND OBJECTIVE BOX
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 Patient reports having dry cough without hemoptysis over the course of 2-3 weeks with associated sob. Then over last 2d began to develop progressive sharp pain on inspiration. He called his physician who instructed him to go to the ED. He notably was recently taken off of apixiban to bone biopsy of pelvic lesion on 6/28/19 and he did not restart as he was unsure if he was supposed to. No fever, chills, chest pain at rest (only with inspiration), no inability to lay flat, no swelling in lower extremities. His daughter informs that his oncologist informed his wife that he has stage IV disease with need for palliative RT and expected life expectancy of 7mo however patient is unaware of prognostication beyond spread to bones. (04 Jul 2019 04:13)  Records reviewed. Pt was constipated for a few days after taking narcotics and finally was able to have a large BM which was formed but dark, not melena, and found to be guaiac positive. Reports having had an EGD 1 month ago at [place]-small varices too small to band, placed on beta blocker    PAST MEDICAL & SURGICAL HISTORY:  Lung cancer, primary, with metastasis from lung to other site  Blood clot of common bile duct  Gout  Hypertension  Diabetes  No significant past surgical history      MEDICATIONS  (STANDING):  apixaban 5 milliGRAM(s) Oral every 12 hours  cefTRIAXone   IVPB 1000 milliGRAM(s) IV Intermittent every 24 hours  cholecalciferol 2000 Unit(s) Oral daily  cyanocobalamin 1000 MICROGram(s) Oral daily  dextrose 5%. 1000 milliLiter(s) (50 mL/Hr) IV Continuous <Continuous>  dextrose 50% Injectable 12.5 Gram(s) IV Push once  dextrose 50% Injectable 25 Gram(s) IV Push once  dextrose 50% Injectable 25 Gram(s) IV Push once  furosemide    Tablet 40 milliGRAM(s) Oral daily  guaiFENesin  milliGRAM(s) Oral every 12 hours  hydrALAZINE 25 milliGRAM(s) Oral two times a day  insulin glargine Injectable (LANTUS) 30 Unit(s) SubCutaneous at bedtime  insulin lispro (HumaLOG) corrective regimen sliding scale   SubCutaneous three times a day before meals  insulin lispro (HumaLOG) corrective regimen sliding scale   SubCutaneous at bedtime  lactulose Syrup 10 Gram(s) Oral daily  pantoprazole    Tablet 40 milliGRAM(s) Oral before breakfast  propranolol 40 milliGRAM(s) Oral two times a day  spironolactone 25 milliGRAM(s) Oral daily  traZODone 50 milliGRAM(s) Oral at bedtime      Allergies    eggs (Nausea)  No Known Drug Allergies    Intolerances        SOCIAL HISTORY:  Denies ETOh,Smoking,     FAMILY HISTORY:  FH: pancreatic cancer      REVIEW OF SYSTEMS:    CONSTITUTIONAL: No weakness, fevers or chills  EYES/ENT: No visual changes;  No vertigo or throat pain   NECK: No pain or stiffness  RESPIRATORY: No cough, wheezing, hemoptysis; No shortness of breath  CARDIOVASCULAR: No chest pain or palpitations  GASTROINTESTINAL: No abdominal or epigastric pain. No nausea, vomiting, or hematemesis; No diarrhea or constipation. No melena or hematochezia.  GENITOURINARY: No dysuria, frequency or hematuria  NEUROLOGICAL: No numbness or weakness  SKIN: No itching, burning, rashes, or lesions   All other review of systems is negative unless indicated above.    VITAL:  T(C): , Max: 36.8 (07-07-19 @ 21:08)  T(F): , Max: 98.3 (07-07-19 @ 21:08)  HR: 63 (07-08-19 @ 17:30)  BP: 111/63 (07-08-19 @ 17:30)  BP(mean): --  RR: 18 (07-08-19 @ 11:59)  SpO2: 97% (07-08-19 @ 11:59)  Wt(kg): --    I and O's:    07-06 @ 07:01  -  07-07 @ 07:00  --------------------------------------------------------  IN: 970 mL / OUT: 400 mL / NET: 570 mL    07-07 @ 07:01 - 07-08 @ 07:00  --------------------------------------------------------  IN: 1320 mL / OUT: 500 mL / NET: 820 mL    07-08 @ 07:01 - 07-08 @ 18:33  --------------------------------------------------------  IN: 240 mL / OUT: 0 mL / NET: 240 mL          PHYSICAL EXAM:    Constitutional: NAD  HEENT: PERRLA,   Neck: No JVD  Respiratory: CTA B/L  Cardiovascular: S1 and S2  Gastrointestinal: BS+, soft, NT/ND no ascites  Extremities: No peripheral edema  Neurological: A/O x 3,  no asterixis  Psychiatric: Normal mood, normal affect    LABS:                        9.4    4.6   )-----------( 60       ( 08 Jul 2019 05:57 )             26.6     07-08    131<L>  |  96  |  42<H>  ----------------------------<  185<H>  4.1   |  24  |  1.65<H>    Ca    9.2      08 Jul 2019 05:57            RADIOLOGY & ADDITIONAL STUDIES:

## 2019-07-08 NOTE — CONSULT NOTE ADULT - ASSESSMENT
Anemia, guaiac positive, cirrhosis with h/o HCC now looks like metastatic disease.  Not actively bleeding  Supportive care, would not plan endoscopic evaluation unless shows signs of active bleeding  Protonix 40mg daily  ok from GI perspective for d/c with outpt f/u

## 2019-07-09 ENCOUNTER — TRANSCRIPTION ENCOUNTER (OUTPATIENT)
Age: 69
End: 2019-07-09

## 2019-07-09 VITALS
RESPIRATION RATE: 18 BRPM | SYSTOLIC BLOOD PRESSURE: 109 MMHG | HEART RATE: 62 BPM | TEMPERATURE: 98 F | OXYGEN SATURATION: 98 % | DIASTOLIC BLOOD PRESSURE: 66 MMHG

## 2019-07-09 LAB
ANION GAP SERPL CALC-SCNC: 13 MMOL/L — SIGNIFICANT CHANGE UP (ref 5–17)
BUN SERPL-MCNC: 42 MG/DL — HIGH (ref 7–23)
CALCIUM SERPL-MCNC: 9 MG/DL — SIGNIFICANT CHANGE UP (ref 8.4–10.5)
CHLORIDE SERPL-SCNC: 96 MMOL/L — SIGNIFICANT CHANGE UP (ref 96–108)
CO2 SERPL-SCNC: 23 MMOL/L — SIGNIFICANT CHANGE UP (ref 22–31)
CREAT SERPL-MCNC: 1.58 MG/DL — HIGH (ref 0.5–1.3)
GLUCOSE BLDC GLUCOMTR-MCNC: 187 MG/DL — HIGH (ref 70–99)
GLUCOSE BLDC GLUCOMTR-MCNC: 306 MG/DL — HIGH (ref 70–99)
GLUCOSE SERPL-MCNC: 248 MG/DL — HIGH (ref 70–99)
HCT VFR BLD CALC: 25.4 % — LOW (ref 39–50)
HGB BLD-MCNC: 8.7 G/DL — LOW (ref 13–17)
MCHC RBC-ENTMCNC: 34.2 PG — HIGH (ref 27–34)
MCHC RBC-ENTMCNC: 34.3 GM/DL — SIGNIFICANT CHANGE UP (ref 32–36)
MCV RBC AUTO: 99.6 FL — SIGNIFICANT CHANGE UP (ref 80–100)
PLATELET # BLD AUTO: 59 K/UL — LOW (ref 150–400)
POTASSIUM SERPL-MCNC: 4.2 MMOL/L — SIGNIFICANT CHANGE UP (ref 3.5–5.3)
POTASSIUM SERPL-SCNC: 4.2 MMOL/L — SIGNIFICANT CHANGE UP (ref 3.5–5.3)
RBC # BLD: 2.55 M/UL — LOW (ref 4.2–5.8)
RBC # FLD: 12.6 % — SIGNIFICANT CHANGE UP (ref 10.3–14.5)
SODIUM SERPL-SCNC: 132 MMOL/L — LOW (ref 135–145)
WBC # BLD: 4.3 K/UL — SIGNIFICANT CHANGE UP (ref 3.8–10.5)
WBC # FLD AUTO: 4.3 K/UL — SIGNIFICANT CHANGE UP (ref 3.8–10.5)

## 2019-07-09 PROCEDURE — 87581 M.PNEUMON DNA AMP PROBE: CPT

## 2019-07-09 PROCEDURE — 84550 ASSAY OF BLOOD/URIC ACID: CPT

## 2019-07-09 PROCEDURE — 85610 PROTHROMBIN TIME: CPT

## 2019-07-09 PROCEDURE — 84484 ASSAY OF TROPONIN QUANT: CPT

## 2019-07-09 PROCEDURE — 96374 THER/PROPH/DIAG INJ IV PUSH: CPT

## 2019-07-09 PROCEDURE — 86803 HEPATITIS C AB TEST: CPT

## 2019-07-09 PROCEDURE — 87798 DETECT AGENT NOS DNA AMP: CPT

## 2019-07-09 PROCEDURE — 80053 COMPREHEN METABOLIC PANEL: CPT

## 2019-07-09 PROCEDURE — 87486 CHLMYD PNEUM DNA AMP PROBE: CPT

## 2019-07-09 PROCEDURE — 87186 SC STD MICRODIL/AGAR DIL: CPT

## 2019-07-09 PROCEDURE — 86022 PLATELET ANTIBODIES: CPT

## 2019-07-09 PROCEDURE — 82272 OCCULT BLD FECES 1-3 TESTS: CPT

## 2019-07-09 PROCEDURE — 83036 HEMOGLOBIN GLYCOSYLATED A1C: CPT

## 2019-07-09 PROCEDURE — 82962 GLUCOSE BLOOD TEST: CPT

## 2019-07-09 PROCEDURE — 83880 ASSAY OF NATRIURETIC PEPTIDE: CPT

## 2019-07-09 PROCEDURE — 87633 RESP VIRUS 12-25 TARGETS: CPT

## 2019-07-09 PROCEDURE — 80048 BASIC METABOLIC PNL TOTAL CA: CPT

## 2019-07-09 PROCEDURE — 93005 ELECTROCARDIOGRAM TRACING: CPT

## 2019-07-09 PROCEDURE — 85730 THROMBOPLASTIN TIME PARTIAL: CPT

## 2019-07-09 PROCEDURE — A9540: CPT

## 2019-07-09 PROCEDURE — A9567: CPT

## 2019-07-09 PROCEDURE — 86901 BLOOD TYPING SEROLOGIC RH(D): CPT

## 2019-07-09 PROCEDURE — 96376 TX/PRO/DX INJ SAME DRUG ADON: CPT

## 2019-07-09 PROCEDURE — 86900 BLOOD TYPING SEROLOGIC ABO: CPT

## 2019-07-09 PROCEDURE — 86850 RBC ANTIBODY SCREEN: CPT

## 2019-07-09 PROCEDURE — 70450 CT HEAD/BRAIN W/O DYE: CPT

## 2019-07-09 PROCEDURE — 97162 PT EVAL MOD COMPLEX 30 MIN: CPT

## 2019-07-09 PROCEDURE — 80076 HEPATIC FUNCTION PANEL: CPT

## 2019-07-09 PROCEDURE — 78582 LUNG VENTILAT&PERFUS IMAGING: CPT

## 2019-07-09 PROCEDURE — 99285 EMERGENCY DEPT VISIT HI MDM: CPT | Mod: 25

## 2019-07-09 PROCEDURE — 85027 COMPLETE CBC AUTOMATED: CPT

## 2019-07-09 PROCEDURE — 87070 CULTURE OTHR SPECIMN AEROBIC: CPT

## 2019-07-09 PROCEDURE — 87521 HEPATITIS C PROBE&RVRS TRNSC: CPT

## 2019-07-09 PROCEDURE — 71046 X-RAY EXAM CHEST 2 VIEWS: CPT

## 2019-07-09 PROCEDURE — 71250 CT THORAX DX C-: CPT

## 2019-07-09 PROCEDURE — 93970 EXTREMITY STUDY: CPT

## 2019-07-09 PROCEDURE — 94640 AIRWAY INHALATION TREATMENT: CPT

## 2019-07-09 RX ORDER — CEFUROXIME AXETIL 250 MG
1 TABLET ORAL
Qty: 6 | Refills: 0
Start: 2019-07-09 | End: 2019-07-11

## 2019-07-09 RX ORDER — INSULIN ASPART 100 [IU]/ML
0 INJECTION, SOLUTION SUBCUTANEOUS
Qty: 0 | Refills: 0 | DISCHARGE

## 2019-07-09 RX ORDER — SPIRONOLACTONE 25 MG/1
1 TABLET, FILM COATED ORAL
Qty: 0 | Refills: 0 | DISCHARGE

## 2019-07-09 RX ORDER — INSULIN GLARGINE 100 [IU]/ML
0 INJECTION, SOLUTION SUBCUTANEOUS
Qty: 0 | Refills: 0 | DISCHARGE

## 2019-07-09 RX ORDER — APIXABAN 2.5 MG/1
1 TABLET, FILM COATED ORAL
Qty: 0 | Refills: 0 | DISCHARGE
Start: 2019-07-09

## 2019-07-09 RX ORDER — OXYCODONE HYDROCHLORIDE 5 MG/1
1 TABLET ORAL
Qty: 0 | Refills: 0 | DISCHARGE

## 2019-07-09 RX ORDER — FUROSEMIDE 40 MG
1 TABLET ORAL
Qty: 0 | Refills: 0 | DISCHARGE

## 2019-07-09 RX ORDER — CEFOXITIN 1 G/1
1 INJECTION, POWDER, FOR SOLUTION INTRAVENOUS
Qty: 6 | Refills: 0
Start: 2019-07-09 | End: 2019-07-11

## 2019-07-09 RX ADMIN — Medication 25 MILLIGRAM(S): at 10:29

## 2019-07-09 RX ADMIN — PANTOPRAZOLE SODIUM 40 MILLIGRAM(S): 20 TABLET, DELAYED RELEASE ORAL at 05:25

## 2019-07-09 RX ADMIN — Medication 1: at 08:08

## 2019-07-09 RX ADMIN — Medication 40 MILLIGRAM(S): at 10:29

## 2019-07-09 RX ADMIN — SPIRONOLACTONE 25 MILLIGRAM(S): 25 TABLET, FILM COATED ORAL at 05:25

## 2019-07-09 RX ADMIN — Medication 4: at 12:23

## 2019-07-09 RX ADMIN — PREGABALIN 1000 MICROGRAM(S): 225 CAPSULE ORAL at 10:29

## 2019-07-09 RX ADMIN — APIXABAN 5 MILLIGRAM(S): 2.5 TABLET, FILM COATED ORAL at 05:25

## 2019-07-09 RX ADMIN — Medication 600 MILLIGRAM(S): at 05:25

## 2019-07-09 RX ADMIN — Medication 2000 UNIT(S): at 10:29

## 2019-07-09 RX ADMIN — Medication 40 MILLIGRAM(S): at 05:25

## 2019-07-09 NOTE — PROGRESS NOTE ADULT - PROBLEM SELECTOR PLAN 2
Lingular infiltrate w peribronchiolar cuffing  -C/w CAP treatement with Ceftriaxone  -d/c Azithromycin  -Sputum culture with E.Coli  -OK to change to PO Ceftin to complete 7 total days abx when ready to discharge  -Add Mucinex 600mg PO BID
Lingular infiltrate w peribronchiolar cuffing  -C/w CAP treatement with Ceftriaxone and azithromycin  -RVP negative  -F/u sputum culture, urine legionella   -Pending diff results on CBC  -Add Mucinex 600mg PO BID
sodium improving with decreased pain    possible mild SIADH (pain can stimulate ADH production)  defer further evaluation for now
sodium improving with decreased pain    possible mild SIADH (pain can stimulate ADH production)  defer further evaluation for now
Lingular infiltrate w peribronchiolar cuffing  -Sputum culture with E.Coli  -OK to change to PO Ceftin to complete 7 total days abx when ready to discharge  -Add Mucinex 600mg PO BID

## 2019-07-09 NOTE — PROGRESS NOTE ADULT - SUBJECTIVE AND OBJECTIVE BOX
CHIEF COMPLAINT:Patient is a 68y old  Male who presents with a chief complaint of 68M presenting with progressive sob with cough and pain on inspiration (09 Jul 2019 08:40)    	        PAST MEDICAL & SURGICAL HISTORY:  Lung cancer, primary, with metastasis from lung to other site  Blood clot of common bile duct  Gout  Hypertension  Diabetes  No significant past surgical history          REVIEW OF SYSTEMS:  CONSTITUTIONAL: No fever, weight loss, or fatigue  EYES: No eye pain, visual disturbances, or discharge  NECK: No pain or stiffness  RESPIRATORY: No cough, wheezing, chills or hemoptysis; No Shortness of Breath  CARDIOVASCULAR: No chest pain, palpitations, passing out, dizziness, or leg swelling  GASTROINTESTINAL: No abdominal or epigastric pain. No nausea, vomiting, or hematemesis; No diarrhea or constipation. No melena or hematochezia.  GENITOURINARY: No dysuria, frequency, hematuria, or incontinence  NEUROLOGICAL: No headaches, memory loss, loss of strength, numbness, or tremors  SKIN: No itching, burning, rashes, or lesions   LYMPH Nodes: No enlarged glands  ENDOCRINE: No heat or cold intolerance; No hair loss  MUSCULOSKELETAL: No joint pain or swelling; No muscle, back, or extremity pain    Medications:  MEDICATIONS  (STANDING):  apixaban 5 milliGRAM(s) Oral every 12 hours  cefTRIAXone   IVPB 1000 milliGRAM(s) IV Intermittent every 24 hours  cholecalciferol 2000 Unit(s) Oral daily  cyanocobalamin 1000 MICROGram(s) Oral daily  dextrose 5%. 1000 milliLiter(s) (50 mL/Hr) IV Continuous <Continuous>  dextrose 50% Injectable 12.5 Gram(s) IV Push once  dextrose 50% Injectable 25 Gram(s) IV Push once  dextrose 50% Injectable 25 Gram(s) IV Push once  furosemide    Tablet 40 milliGRAM(s) Oral daily  guaiFENesin  milliGRAM(s) Oral every 12 hours  hydrALAZINE 25 milliGRAM(s) Oral two times a day  insulin glargine Injectable (LANTUS) 36 Unit(s) SubCutaneous at bedtime  insulin lispro (HumaLOG) corrective regimen sliding scale   SubCutaneous three times a day before meals  insulin lispro (HumaLOG) corrective regimen sliding scale   SubCutaneous at bedtime  lactulose Syrup 10 Gram(s) Oral daily  pantoprazole    Tablet 40 milliGRAM(s) Oral before breakfast  propranolol 40 milliGRAM(s) Oral two times a day  spironolactone 25 milliGRAM(s) Oral daily  traZODone 50 milliGRAM(s) Oral at bedtime    MEDICATIONS  (PRN):  ALBUTerol    90 MICROgram(s) HFA Inhaler 2 Puff(s) Inhalation every 6 hours PRN Shortness of Breath  dextrose 40% Gel 15 Gram(s) Oral once PRN Blood Glucose LESS THAN 70 milliGRAM(s)/deciliter  glucagon  Injectable 1 milliGRAM(s) IntraMuscular once PRN Glucose LESS THAN 70 milligrams/deciliter  oxyCODONE    IR 5 milliGRAM(s) Oral every 4 hours PRN Moderate to Severe Pain    	    PHYSICAL EXAM:  T(C): 36.8 (07-09-19 @ 10:26), Max: 36.9 (07-09-19 @ 04:14)  HR: 62 (07-09-19 @ 10:26) (58 - 69)  BP: 109/66 (07-09-19 @ 10:26) (100/55 - 119/66)  RR: 18 (07-09-19 @ 10:26) (18 - 18)  SpO2: 98% (07-09-19 @ 10:26) (96% - 98%)  Wt(kg): --  I&O's Summary    08 Jul 2019 07:01  -  09 Jul 2019 07:00  --------------------------------------------------------  IN: 480 mL / OUT: 0 mL / NET: 480 mL        Appearance: Normal	  HEENT:   Normal oral mucosa, PERRL, EOMI	  Lymphatic: No lymphadenopathy  Cardiovascular: Normal S1 S2, No JVD, No murmurs, No edema  Respiratory: Lungs clear to auscultation	  Psychiatry: A & O x 3, Mood & affect appropriate  Gastrointestinal:  Soft, Non-tender, + BS	  Skin: No rashes, No ecchymoses, No cyanosis	  Neurologic: Non-focal  Extremities: Normal range of motion, No clubbing, cyanosis or edema  Vascular: Peripheral pulses palpable 2+ bilaterally    TELEMETRY: 	    ECG:  	  RADIOLOGY:  OTHER: 	  	  LABS:	 	    CARDIAC MARKERS:                                8.7    4.3   )-----------( 59       ( 09 Jul 2019 05:27 )             25.4     07-09    132<L>  |  96  |  42<H>  ----------------------------<  248<H>  4.2   |  23  |  1.58<H>    Ca    9.0      09 Jul 2019 05:27      proBNP:   Lipid Profile:   HgA1c:   TSH: CHIEF COMPLAINT:Patient is a 68y old  Male who presents with a chief complaint of 68M presenting with progressive sob with cough and pain on inspiration (09 Jul 2019 08:40)    	        PAST MEDICAL & SURGICAL HISTORY:  Lung cancer, primary, with metastasis from lung to other site  Blood clot of common bile duct  Gout  Hypertension  Diabetes  No significant past surgical history          REVIEW OF SYSTEMS:  CONSTITUTIONAL: feels better  EYES: No eye pain, visual disturbances, or discharge  NECK: No pain or stiffness  RESPIRATORY: dec pain w/ insiration  CARDIOVASCULAR: No chest pain, palpitations, passing out, dizziness, or leg swelling  GASTROINTESTINAL: No abdominal or epigastric pain. No nausea, vomiting, or hematemesis; No diarrhea or constipation. No melena or hematochezia.  GENITOURINARY: No dysuria, frequency, hematuria, or incontinence  NEUROLOGICAL: No headaches, memory loss, loss of strength, numbness, or tremors  MUSCULOSKELETAL: No joint pain or swelling; No muscle, back, or extremity pain    Medications:  MEDICATIONS  (STANDING):  apixaban 5 milliGRAM(s) Oral every 12 hours  cefTRIAXone   IVPB 1000 milliGRAM(s) IV Intermittent every 24 hours  cholecalciferol 2000 Unit(s) Oral daily  cyanocobalamin 1000 MICROGram(s) Oral daily  dextrose 5%. 1000 milliLiter(s) (50 mL/Hr) IV Continuous <Continuous>  dextrose 50% Injectable 12.5 Gram(s) IV Push once  dextrose 50% Injectable 25 Gram(s) IV Push once  dextrose 50% Injectable 25 Gram(s) IV Push once  furosemide    Tablet 40 milliGRAM(s) Oral daily  guaiFENesin  milliGRAM(s) Oral every 12 hours  hydrALAZINE 25 milliGRAM(s) Oral two times a day  insulin glargine Injectable (LANTUS) 36 Unit(s) SubCutaneous at bedtime  insulin lispro (HumaLOG) corrective regimen sliding scale   SubCutaneous three times a day before meals  insulin lispro (HumaLOG) corrective regimen sliding scale   SubCutaneous at bedtime  lactulose Syrup 10 Gram(s) Oral daily  pantoprazole    Tablet 40 milliGRAM(s) Oral before breakfast  propranolol 40 milliGRAM(s) Oral two times a day  spironolactone 25 milliGRAM(s) Oral daily  traZODone 50 milliGRAM(s) Oral at bedtime    MEDICATIONS  (PRN):  ALBUTerol    90 MICROgram(s) HFA Inhaler 2 Puff(s) Inhalation every 6 hours PRN Shortness of Breath  dextrose 40% Gel 15 Gram(s) Oral once PRN Blood Glucose LESS THAN 70 milliGRAM(s)/deciliter  glucagon  Injectable 1 milliGRAM(s) IntraMuscular once PRN Glucose LESS THAN 70 milligrams/deciliter  oxyCODONE    IR 5 milliGRAM(s) Oral every 4 hours PRN Moderate to Severe Pain    	    PHYSICAL EXAM:  T(C): 36.8 (07-09-19 @ 10:26), Max: 36.9 (07-09-19 @ 04:14)  HR: 62 (07-09-19 @ 10:26) (58 - 69)  BP: 109/66 (07-09-19 @ 10:26) (100/55 - 119/66)  RR: 18 (07-09-19 @ 10:26) (18 - 18)  SpO2: 98% (07-09-19 @ 10:26) (96% - 98%)  Wt(kg): --  I&O's Summary    08 Jul 2019 07:01  -  09 Jul 2019 07:00  --------------------------------------------------------  IN: 480 mL / OUT: 0 mL / NET: 480 mL        Appearance: Normal	  HEENT:   Normal oral mucosa, PERRL, EOMI	  Lymphatic: No lymphadenopathy  Cardiovascular: Normal S1 S2, No JVD, No murmurs, No edema  Respiratory:dec bs   Psychiatry: A & O x 3, Mood & affect appropriate  Gastrointestinal:  Soft, Non-tender, + BS	  Skin: No rashes, No ecchymoses, No cyanosis	  Neurologic: Non-focal  Extremities: Normal range of motion, No clubbing, cyanosis or edema  Vascular: Peripheral pulses palpable 2+ bilaterally    TELEMETRY: 	    ECG:  	  RADIOLOGY:  OTHER: 	  	  LABS:	 	    CARDIAC MARKERS:                                8.7    4.3   )-----------( 59       ( 09 Jul 2019 05:27 )             25.4     07-09    132<L>  |  96  |  42<H>  ----------------------------<  248<H>  4.2   |  23  |  1.58<H>    Ca    9.0      09 Jul 2019 05:27      proBNP:   Lipid Profile:   HgA1c:   TSH:

## 2019-07-09 NOTE — PROGRESS NOTE ADULT - SUBJECTIVE AND OBJECTIVE BOX
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 He notably was recently taken off of apixiban to bone biopsy of pelvic lesion on 6/28/19 and he did not restart as he was unsure if he was supposed to. No fever, chills, chest pain at rest (only with inspiration). He had been referred for palliative RT to Dr. Allen for hip pain.  In hospital no evidence of DVT or PE.  Getting abx    PAST MEDICAL & SURGICAL HISTORY:  Lung cancer, primary, with metastasis from lung to other site  Blood clot of common bile duct  Gout  Hypertension  Diabetes  No significant past surgical history    Medications:  ALBUTerol    90 MICROgram(s) HFA Inhaler 2 Puff(s) Inhalation every 6 hours PRN Shortness of Breath  apixaban 5 milliGRAM(s) Oral every 12 hours  cefTRIAXone   IVPB 1000 milliGRAM(s) IV Intermittent every 24 hours  cholecalciferol 2000 Unit(s) Oral daily  cyanocobalamin 1000 MICROGram(s) Oral daily  dextrose 40% Gel 15 Gram(s) Oral once PRN Blood Glucose LESS THAN 70 milliGRAM(s)/deciliter  dextrose 5%. 1000 milliLiter(s) IV Continuous <Continuous>  dextrose 50% Injectable 12.5 Gram(s) IV Push once  dextrose 50% Injectable 25 Gram(s) IV Push once  dextrose 50% Injectable 25 Gram(s) IV Push once  furosemide    Tablet 40 milliGRAM(s) Oral daily  glucagon  Injectable 1 milliGRAM(s) IntraMuscular once PRN Glucose LESS THAN 70 milligrams/deciliter  guaiFENesin  milliGRAM(s) Oral every 12 hours  hydrALAZINE 25 milliGRAM(s) Oral two times a day  insulin glargine Injectable (LANTUS) 36 Unit(s) SubCutaneous at bedtime  insulin lispro (HumaLOG) corrective regimen sliding scale   SubCutaneous three times a day before meals  insulin lispro (HumaLOG) corrective regimen sliding scale   SubCutaneous at bedtime  lactulose Syrup 10 Gram(s) Oral daily  oxyCODONE    IR 5 milliGRAM(s) Oral every 4 hours PRN Moderate to Severe Pain  pantoprazole    Tablet 40 milliGRAM(s) Oral before breakfast  propranolol 40 milliGRAM(s) Oral two times a day  spironolactone 25 milliGRAM(s) Oral daily  traZODone 50 milliGRAM(s) Oral at bedtime    Labs:  CBC Full  -  ( 09 Jul 2019 05:27 )  WBC Count : 4.3 K/uL  Hemoglobin : 8.7 g/dL  Hematocrit : 25.4 %  Platelet Count - Automated : 59 K/uL  Mean Cell Volume : 99.6 fl  Mean Cell Hemoglobin : 34.2 pg  Mean Cell Hemoglobin Concentration : 34.3 gm/dL  Auto Neutrophil # : x  Auto Lymphocyte # : x  Auto Monocyte # : x  Auto Eosinophil # : x  Auto Basophil # : x  Auto Neutrophil % : x  Auto Lymphocyte % : x  Auto Monocyte % : x  Auto Eosinophil % : x  Auto Basophil % : x    07-09    132<L>  |  96  |  42<H>  ----------------------------<  248<H>  4.2   |  23  |  1.58<H>    Ca    9.0      09 Jul 2019 05:27        Radiology:             ROS:  Patient comfortable without distress  No SOB or chest pain  No palpitation  No abdominal pain, diarrhaea or constipation  No weakness of extremities  No skin changes or swelling of legs    Vital Signs Last 24 Hrs  T(C): 36.8 (09 Jul 2019 10:26), Max: 36.9 (09 Jul 2019 04:14)  T(F): 98.3 (09 Jul 2019 10:26), Max: 98.4 (09 Jul 2019 04:14)  HR: 62 (09 Jul 2019 10:26) (58 - 69)  BP: 109/66 (09 Jul 2019 10:26) (100/55 - 115/66)  BP(mean): --  RR: 18 (09 Jul 2019 10:26) (18 - 18)  SpO2: 98% (09 Jul 2019 10:26) (96% - 98%)    Physical exam:  Patient alert and oriented  No distress  CVS: S1, S2 regular or murmur  Chest: bilateral breath sound without rales  Abdomen: soft, not tender, no organomegaly or masses  No focal neuro deficit  No edema      Assessment and Plan: 68M w/ Stage IV liver ca ([id number]) w/ new met to rib/pelvis/L femur s/p multiple ablation ([facility] 2017), cirrhosis w/ esophageal varices (hx of EtOH and HepC s/p treatment), abdominal vessel thrombosis previously on apixiban (stopped approx 2 week prior,) DM2 on insulin, CKD, HTN admitted to [facility] for evaluation of with progressive sob with cough and pain on inspiration. He notably was recently taken off of apixiban to bone biopsy of pelvic lesion on 6/28/19 and he did not restart as he was unsure if he was supposed to. No fever, chills, chest pain at rest (only with inspiration). He had been referred for palliative RT to Dr. Allen for hip pain.  In hospital no evidence of DVT or PE.      PAST MEDICAL & SURGICAL HISTORY:  Lung cancer, primary, with metastasis from lung to other site  Blood clot of common bile duct  Gout  Hypertension  Diabetes  No significant past surgical history    Medications:  ALBUTerol    90 MICROgram(s) HFA Inhaler 2 Puff(s) Inhalation every 6 hours PRN Shortness of Breath  apixaban 5 milliGRAM(s) Oral every 12 hours  cefTRIAXone   IVPB 1000 milliGRAM(s) IV Intermittent every 24 hours  cholecalciferol 2000 Unit(s) Oral daily  cyanocobalamin 1000 MICROGram(s) Oral daily  dextrose 40% Gel 15 Gram(s) Oral once PRN Blood Glucose LESS THAN 70 milliGRAM(s)/deciliter  dextrose 5%. 1000 milliLiter(s) IV Continuous <Continuous>  dextrose 50% Injectable 12.5 Gram(s) IV Push once  dextrose 50% Injectable 25 Gram(s) IV Push once  dextrose 50% Injectable 25 Gram(s) IV Push once  furosemide    Tablet 40 milliGRAM(s) Oral daily  glucagon  Injectable 1 milliGRAM(s) IntraMuscular once PRN Glucose LESS THAN 70 milligrams/deciliter  guaiFENesin  milliGRAM(s) Oral every 12 hours  hydrALAZINE 25 milliGRAM(s) Oral two times a day  insulin glargine Injectable (LANTUS) 36 Unit(s) SubCutaneous at bedtime  insulin lispro (HumaLOG) corrective regimen sliding scale   SubCutaneous three times a day before meals  insulin lispro (HumaLOG) corrective regimen sliding scale   SubCutaneous at bedtime  lactulose Syrup 10 Gram(s) Oral daily  oxyCODONE    IR 5 milliGRAM(s) Oral every 4 hours PRN Moderate to Severe Pain  pantoprazole    Tablet 40 milliGRAM(s) Oral before breakfast  propranolol 40 milliGRAM(s) Oral two times a day  spironolactone 25 milliGRAM(s) Oral daily  traZODone 50 milliGRAM(s) Oral at bedtime    Labs:  CBC Full  -  ( 09 Jul 2019 05:27 )  WBC Count : 4.3 K/uL  Hemoglobin : 8.7 g/dL  Hematocrit : 25.4 %  Platelet Count - Automated : 59 K/uL  Mean Cell Volume : 99.6 fl  Mean Cell Hemoglobin : 34.2 pg  Mean Cell Hemoglobin Concentration : 34.3 gm/dL  Auto Neutrophil # : x  Auto Lymphocyte # : x  Auto Monocyte # : x  Auto Eosinophil # : x  Auto Basophil # : x  Auto Neutrophil % : x  Auto Lymphocyte % : x  Auto Monocyte % : x  Auto Eosinophil % : x  Auto Basophil % : x    07-09    132<L>  |  96  |  42<H>  ----------------------------<  248<H>  4.2   |  23  |  1.58<H>    Ca    9.0      09 Jul 2019 05:27        Radiology:             ROS:  Patient comfortable without distress  No SOB or chest pain  No palpitation  No abdominal pain, diarrhaea or constipation  No weakness of extremities  No skin changes or swelling of legs  Says will go home today    Vital Signs Last 24 Hrs  T(C): 36.8 (09 Jul 2019 10:26), Max: 36.9 (09 Jul 2019 04:14)  T(F): 98.3 (09 Jul 2019 10:26), Max: 98.4 (09 Jul 2019 04:14)  HR: 62 (09 Jul 2019 10:26) (58 - 69)  BP: 109/66 (09 Jul 2019 10:26) (100/55 - 115/66)  BP(mean): --  RR: 18 (09 Jul 2019 10:26) (18 - 18)  SpO2: 98% (09 Jul 2019 10:26) (96% - 98%)    Physical exam:  Patient alert and oriented  No distress  CVS: S1, S2 regular or murmur  Chest: bilateral breath sound without rales  Abdomen: soft, not tender, no organomegaly or masses  No focal neuro deficit  No edema      Assessment and Plan:

## 2019-07-09 NOTE — PROGRESS NOTE ADULT - PROVIDER SPECIALTY LIST ADULT
Gastroenterology
Heme/Onc
Internal Medicine
Nephrology
Pulmonology
Pulmonology
Internal Medicine
Heme/Onc
Internal Medicine
Internal Medicine
Pulmonology

## 2019-07-09 NOTE — PROGRESS NOTE ADULT - PROBLEM SELECTOR PLAN 4
Indeterminate, may represent metastatic disease vs. mucous impaction.

## 2019-07-09 NOTE — CHART NOTE - NSCHARTNOTEFT_GEN_A_CORE
Request from Dr. Carrion to facilitate patient discharge. Medication reconciliation reviewed, revised, and resolved with Dr. Carrion who had medically cleared patient for discharge with follow-up as advised. Please refer to discharge note for detailed hospital course. Patient is currently stable for discharge to home with home care at this time.    Contact # [phone]

## 2019-07-09 NOTE — DISCHARGE NOTE PROVIDER - PROVIDER TOKENS
PROVIDER:[TOKEN:[[id number]],FOLLOWUP:[1 week]],PROVIDER:[TOKEN:[[id number]],FOLLOWUP:[1 week]],PROVIDER:[TOKEN:[[id number]],FOLLOWUP:[1 week]]

## 2019-07-09 NOTE — PROGRESS NOTE ADULT - PROBLEM SELECTOR PROBLEM 3
Liver cancer, primary, with metastasis from liver to other site

## 2019-07-09 NOTE — PROGRESS NOTE ADULT - REASON FOR ADMISSION
68M presenting with progressive sob with cough and pain on inspiration

## 2019-07-09 NOTE — PROGRESS NOTE ADULT - PROBLEM SELECTOR PLAN 3
Stage IV liver CA with metastatic disease to bone  -s/p bone biopsy last week, will f/u outpt with oncolgist  -Eliquis resumed for history of abdominal thrombus
Stage IV liver CA with metastatic disease to bone  -s/p bone biopsy last week, will f/u outpt with oncolgist  -Eliquis resumed for history of abdominal thrombus   -Overall poor prognosis.
Stage IV liver CA with metastatic disease to bone  -s/p bone biopsy last week, will f/u outpt with oncolgist  -Eliquis resumed for history of abdominal thrombus   -Overall poor prognosis.

## 2019-07-09 NOTE — PROGRESS NOTE ADULT - ASSESSMENT
comfortable this am, no bleeding  wife at bedside discussed issues and plan  ok for d/c from GI perspective  no plans for endoscopic evaluation at this time

## 2019-07-09 NOTE — DISCHARGE NOTE PROVIDER - NSDCCPCAREPLAN_GEN_ALL_CORE_FT
PRINCIPAL DISCHARGE DIAGNOSIS  Diagnosis: Pleuritic chest pain  Assessment and Plan of Treatment: Now resolved      SECONDARY DISCHARGE DIAGNOSES  Diagnosis: Pneumonia  Assessment and Plan of Treatment: Pneumonia is a lung infection that can cause a fever, cough, and trouble breathing.  Continue all antibiotics as ordered until complete.  Nutrition is important, eat small frequent meals.  Get lots of rest and drink fluids.  Call your health care provider upon arrival home from hospital and make a follow up appointment for one week.  If your cough worsens, you develop fever greater than 101', you have shaking chills, a fast heartbeat, trouble breathing and/or feel your are breathing much faster than usual, call your healthcare provider.  Make sure you wash your hands frequently.      Diagnosis: Cancer of liver  Assessment and Plan of Treatment: Follow up with Oncologist - Dr. Khan in 1 week for bone Biopsy result    Diagnosis: Lung nodule  Assessment and Plan of Treatment: Follow up with Oncologist - Dr. Khan in 1 week    Diagnosis: Stage 3 chronic kidney disease  Assessment and Plan of Treatment: Avoid taking (NSAIDs) - (ex: Ibuprofen, Advil, Celebrex, Naprosyn)  Avoid taking any nephrotoxic agents (can harm kidneys) - Intravenous contrast for diagnostic testing, combination cold medications.  Have all medications adjusted for your renal function by your Health Care Provider.  Blood pressure control is important.  Take all medication as prescribed.  Follow up with Dr. Ascencio in 1 week      Diagnosis: Essential hypertension  Assessment and Plan of Treatment: Take medications for your blood pressure as recommended.  Eat a heart healthy diet that is low in saturated fats and salt, and includes whole grains, fruits, vegetables and lean protein   Exercise regularly (consult with your physician or cardiologist first); maintain a heart healthy weight.   Continue to follow with your primary physician or cardiologist.   Seek medical help for dizziness, Lightheadedness, Blurry vision, Headache, Chest pain, Shortness of breath  Follow up with your medical doctor to establish long term blood pressure treatment goals.      Diagnosis: Type 2 diabetes mellitus  Assessment and Plan of Treatment: HgA1C this admission - 6.1  Make sure you get your HgA1c checked every three months.  If you take insulin, check your blood glucose before meals and at bedtime.  It's important not to skip any meals.  Keep a log of your blood glucose results and always take it with you to your doctor appointments.  Keep a list of your current medications including injectables and over the counter medications and bring this medication list with you to all your doctor appointments.  If you have not seen your ophthalmologist this year call for appointment.  Check your feet daily for redness, sores, or openings. Do not self treat. If no improvement in two days call your primary care physician for an appointment.  Low blood sugar (hypoglycemia) is a blood sugar below 70mg/dl. Check your blood sugar if you feel signs/symptoms of hypoglycemia. If your blood sugar is below 70 take 15 grams of carbohydrates (ex 4 oz of apple juice, 3-4 glucose tablets, or 4-6 oz of regular soda) wait 15 minutes and repeat blood sugar to make sure it comes up above 70.  If your blood sugar is above 70 and you are due for a meal, have a meal.  If you are not due for a meal have a snack.  This snack helps keeps your blood sugar at a safe range.

## 2019-07-09 NOTE — DISCHARGE NOTE PROVIDER - CARE PROVIDER_API CALL
Shaan Emmanuel (DO)  Gastroenterology; Internal Medicine  [address], [facility] [address]  [place] [address]  Phone: [phone]  Fax: [phone]  Follow Up Time: 1 week    Bruno Khan (MD)  Hematology; Medical Oncology  [address], [address]  [place] [address]  Phone: [phone]  Fax: [phone]  Follow Up Time: 1 week    Anita Ascencio (DO)  Nephrology  [address]  [place] [address]  Phone: [phone]  Fax: [phone]  Follow Up Time: 1 week

## 2019-07-09 NOTE — PROGRESS NOTE ADULT - PROBLEM SELECTOR PLAN 1
likely 2nd PNA vs. metastatic rib lesions  -Pain control
likely 2nd PNA.  -Much improved today
renal function improved slightly with decreased dose of lasix and aldactone  continue at current doses  dose meds for eGFR ~35-40  Avoid NSAIDs for pain control
renal function improved slightly with decreased dose of lasix and aldactone  continue at current doses  dose meds for eGFR ~35-40  Avoid NSAIDs for pain control
likely 2nd PNA vs. metastatic rib lesions  -Pain control

## 2019-07-09 NOTE — PROGRESS NOTE ADULT - SUBJECTIVE AND OBJECTIVE BOX
Follow-up Pulm Progress Note    No new respiratory events overnight.  Denies SOB/CP.   Still complaining of L sided pleuritis, but improved from admission  95% on RA    Medications:  MEDICATIONS  (STANDING):  apixaban 5 milliGRAM(s) Oral every 12 hours  cefTRIAXone   IVPB 1000 milliGRAM(s) IV Intermittent every 24 hours  cholecalciferol 2000 Unit(s) Oral daily  cyanocobalamin 1000 MICROGram(s) Oral daily  dextrose 5%. 1000 milliLiter(s) (50 mL/Hr) IV Continuous <Continuous>  dextrose 50% Injectable 12.5 Gram(s) IV Push once  dextrose 50% Injectable 25 Gram(s) IV Push once  dextrose 50% Injectable 25 Gram(s) IV Push once  furosemide    Tablet 40 milliGRAM(s) Oral daily  guaiFENesin  milliGRAM(s) Oral every 12 hours  hydrALAZINE 25 milliGRAM(s) Oral two times a day  insulin glargine Injectable (LANTUS) 36 Unit(s) SubCutaneous at bedtime  insulin lispro (HumaLOG) corrective regimen sliding scale   SubCutaneous three times a day before meals  insulin lispro (HumaLOG) corrective regimen sliding scale   SubCutaneous at bedtime  lactulose Syrup 10 Gram(s) Oral daily  pantoprazole    Tablet 40 milliGRAM(s) Oral before breakfast  propranolol 40 milliGRAM(s) Oral two times a day  spironolactone 25 milliGRAM(s) Oral daily  traZODone 50 milliGRAM(s) Oral at bedtime    MEDICATIONS  (PRN):  ALBUTerol    90 MICROgram(s) HFA Inhaler 2 Puff(s) Inhalation every 6 hours PRN Shortness of Breath  dextrose 40% Gel 15 Gram(s) Oral once PRN Blood Glucose LESS THAN 70 milliGRAM(s)/deciliter  glucagon  Injectable 1 milliGRAM(s) IntraMuscular once PRN Glucose LESS THAN 70 milligrams/deciliter  oxyCODONE    IR 5 milliGRAM(s) Oral every 4 hours PRN Moderate to Severe Pain          Vital Signs Last 24 Hrs  T(C): 36.8 (09 Jul 2019 10:26), Max: 36.9 (09 Jul 2019 04:14)  T(F): 98.3 (09 Jul 2019 10:26), Max: 98.4 (09 Jul 2019 04:14)  HR: 62 (09 Jul 2019 10:26) (58 - 69)  BP: 109/66 (09 Jul 2019 10:26) (100/55 - 115/66)  BP(mean): --  RR: 18 (09 Jul 2019 10:26) (18 - 18)  SpO2: 98% (09 Jul 2019 10:26) (96% - 98%) on RA          07-08 @ 07:01  -  07-09 @ 07:00  --------------------------------------------------------  IN: 480 mL / OUT: 0 mL / NET: 480 mL          LABS:                        8.7    4.3   )-----------( 59       ( 09 Jul 2019 05:27 )             25.4     07-09    132<L>  |  96  |  42<H>  ----------------------------<  248<H>  4.2   |  23  |  1.58<H>    Ca    9.0      09 Jul 2019 05:27            CAPILLARY BLOOD GLUCOSE      POCT Blood Glucose.: 306 mg/dL (09 Jul 2019 12:22)              HIT ab Negative 07-04 @ 13:55  HIT ab EIA --              CULTURES: (if applicable)  Culture Results:   Moderate Escherichia coli  Normal Respiratory Esther present (07-05 @ 21:49)    Most recent blood culture -- 07-05 @ 21:49   Escherichia coli Escherichia coli .Sputum 07-05 @ 21:49    Blood culture 07-05 @ 21:49  --    Numerous polymorphonuclear leukocytes per low power field  Rare Squamous epithelial cells per low power field  Numerous Gram Negative Rods seen per oil power field  CHERRY  Escherichia coli  Escherichia coli    Urine culture    -->      Physical Examination:  PULM: Clear to auscultation bilaterally, no significant sputum production  CVS: S1, S2 heard    RADIOLOGY REVIEWED  CT chest: Lingular infiltrate   Scattered pulm nodules

## 2019-07-09 NOTE — PROGRESS NOTE ADULT - ASSESSMENT
67 y/o male  w/ Stage IV liver ca ([id number]) w/ new mets to rib/pelvis/L femur s/p multiple ablation ([facility] 2017), cirrhosis w/ esophageal varices (hx of EtOH and HepC s/p treatment), abdominal vessel thrombosis previously on apixiban (stopped approx 2 week prior,) DM2 on insulin, CKD, HTN presents for evaluation of pleuritic chest pain suspected multifactorial from new mets to ribs and to lung however cannot rule out PE       ·  Problem: Pleuritis.    mets  - CT chest w/o contrast documents new lung nodules   v.q neg  onc eval noted  c/w eliquis  pulm eval noted  pna  abs as per pulm   change to po and d/c           : Liver cancer, primary, with metastasis from liver to other site.     Stage IV liver ca  onc eval noted  - met to bone, lung and abdomen.      ·  Problem: Thrombosis.  Plan: Hx of abdominal vessel thrombosis however patient unsure of type  a/c       ·  Problem: Hepatic cirrhosis, unspecified hepatic cirrhosis type, unspecified whether ascites present.  : Hx of cirrhosis >15 year, hx of EtOH and HepC (treated)  - c/w, spironolactone, propranolol and lactulose.       ·  Problem: Diabetes mellitus.  Plan: DM2 on inslulin  c.w  lantus  - carb consistent diet.       Problem: Essential hypertension.   Plan: - c/w home antihypertensives with hold parameters.      ·  Problem: Stage 3 chronic kidney disease.  renal eval noted  cr stable   c/w meds  - avoid nephrotoxic agents.       anemia  dec hg  hg stable  gi cleared to go home w/ outpt f/u    pt

## 2019-07-09 NOTE — DISCHARGE NOTE NURSING/CASE MANAGEMENT/SOCIAL WORK - NSDCDPATPORTLINK_GEN_ALL_CORE
You can access the [facility] Portal, offered by [facility], by registering with the following website: [email or web address]

## 2019-07-09 NOTE — DISCHARGE NOTE PROVIDER - HOSPITAL COURSE
CT chest: Scattered pulm nodules    Lingular infiltrate             Assessment and Plan:     · Assessment         67 y/o M with PMH of Stage IV Liver CA (diagnosed 2017), newly diagnosed osseus metastatic disease to rib/pelvis/L femur (s/p biopsy last week), cirrhosis w/ esophageal varices (hx of EtOH and HepC s/p treatment), abdominal vessel thrombosis (previously on Eliquis, stopped 2 weeks ago for biopsy) DM2 on insulin, CKD, HTN presents 7/4 with 2 week history of increased productive cough (white-yellow sputum) with pleuritic left sided CP that began on 7/1. VQ scan with low probability PE, VA duplex negative for DVT.    Chest pain likely Pleuritic in nature - likely 2nd PNA vs. metastatic rib lesions    CT chest: Scattered pulm nodules. Lingular infiltrate. Bilateral lung nodule on CT chest - Indeterminate, may represent metastatic disease vs. mucous impaction.     Treated for Pneumonia - CAP treatement.Placed on IV Ceftriaxone and    Azithromycin, Sputum culture with E.Coli. Switched to PO Ceftin to complete 7 total days abx when ready to discharge, added Mucinex 600mg PO BID.     Patient has metastatic disease, Hepatocellular carcinoma with metastases to bones, ? lung    -s/p bone biopsy on 6/28 with Dr. Khan as outpatient-  Follow up as outpt with oncologist     However was discharged he will be getting radiation therapy for asymptomatic hip lesion.        GI - Dr. Emmanuel consulted for Anemia, guaiac positive, cirrhosis with h/o HCC now looks like metastatic disease.  Not actively bleeding. Continue supportive care, no planned endoscopic evaluation as pt has no signs of active bleeding. H/H stable. Protonix 40mg daily    Eliquis resumed for history of abdominal thrombus     Overall poor prognosis. CT chest: Scattered pulm nodules    Lingular infiltrate             Assessment and Plan:     · Assessment         67 y/o M with PMH of Stage IV Liver CA (diagnosed 2017), newly diagnosed osseus metastatic disease to rib/pelvis/L femur (s/p biopsy last week), cirrhosis w/ esophageal varices (hx of EtOH and HepC s/p treatment), abdominal vessel thrombosis (previously on Eliquis, stopped 2 weeks ago for biopsy) DM2 on insulin, CKD, HTN presents 7/4 with 2 week history of increased productive cough (white-yellow sputum) with pleuritic left sided CP that began on 7/1. VQ scan with low probability PE, VA duplex negative for DVT.    Chest pain likely Pleuritic in nature - likely 2nd PNA vs. metastatic rib lesions    CT chest: Scattered pulm nodules. Lingular infiltrate. Bilateral lung nodule on CT chest - Indeterminate, may represent metastatic disease vs. mucous impaction.     Treated for Pneumonia - CAP treatement.Placed on IV Ceftriaxone and    Azithromycin, Sputum culture with E.Coli. Switched to PO Ceftin to complete 7 total days abx when ready to discharge, added Mucinex 600mg PO BID.     Patient has metastatic disease, Hepatocellular carcinoma with metastases to bones, ? lung    -s/p bone biopsy on 6/28 with Dr. Khan as outpatient-  Follow up as outpt with oncologist     On discharged he will be getting radiation therapy for asymptomatic hip lesion.    GI - Dr. Emmanuel consulted for Anemia, guaiac positive, cirrhosis with h/o HCC now looks like metastatic disease.  Not actively bleeding. Continue supportive care, no planned endoscopic evaluation as pt has no signs of active bleeding. H/H stable. Protonix 40mg daily. Eliquis resumed for history of abdominal thrombus. Follow up with GI - Dr. Emmanuel as outpatient

## 2019-07-09 NOTE — PROGRESS NOTE ADULT - ASSESSMENT
69 y/o man with above mentioned history.  SOB was not related 2 pulmonary embolism.  He has been seen by pulmonary and started on antibiotic.  CBC as noted. He has CKD. His hemoglobin was somewhat lower than before. He is known to have thrombocytopenia but platelets are safe.  Will follow clinically. Gastroenterology saw him and he will be followed without EGD etc at present. He is on beta blocker.  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 clinically and had bone biopsy the result for which is pending.  However was discharged he will be getting radiation therapy for asymptomatic hip lesion. 67 y/o man with above mentioned history was admitted with SOB  SOB was not related 2 pulmonary embolism.  He has been seen by pulmonary and started on antibiotic, which was changed to PO for d/c.  CBC as noted. He has CKD. His hemoglobin has been somewhat lower than before. He is known to have thrombocytopenia but platelets are safe.  Gastroenterology saw him and he will be followed without EGD etc at present. He is on beta blocker.  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 clinically and had bone biopsy the result for which is pending as of now  He knows to follow up for radiation therapy for asymptomatic hip lesion when he is home and to follow up with Dr. Khan.  Patient's family called me last night to discuss issues regarding discharge and follow up

## 2021-06-10 NOTE — PATIENT PROFILE ADULT - HEALTH LITERACY
Patient to the ED from [facility]. Patient states that within the past hour he began having chest pain in left chest which onset while walking back to his room. Patient states that he was given 1 dose of sublingual nitro and had complete resolution of pain. Patient is resting in bed with easy and unlabored respirations. Call light in reach. Side rails up x2. Patient denies further complaints or concerns. Will monitor.         Naima Lee RN  06/10/21 [id number] no

## 2021-07-28 NOTE — PHYSICAL THERAPY INITIAL EVALUATION ADULT - GAIT DISTANCE, PT EVAL
No change in History and Physical since the last visit.  I went over the planned procedure in detail today.  All risks, benefits and alternatives were explained in detail again.  All questions were answered.  The patient understood the plan and all that was discussed and wants to proceed with the planned procedure today.  The patient understands the team approach to care in the operating room and agrees to the procedure as such.  The patient has been cleared by medicine for this procedure and all laboratory tests are stable.   50 feet

## 2022-06-14 NOTE — ED ADULT TRIAGE NOTE - WEIGHT IN KG
Transport on way to take pt to [employer], [facility]. 72.6
